# Patient Record
Sex: FEMALE | Race: WHITE | Employment: OTHER | URBAN - METROPOLITAN AREA
[De-identification: names, ages, dates, MRNs, and addresses within clinical notes are randomized per-mention and may not be internally consistent; named-entity substitution may affect disease eponyms.]

---

## 2017-01-09 DIAGNOSIS — F32.A DEPRESSION, UNSPECIFIED DEPRESSION TYPE: ICD-10-CM

## 2017-01-09 DIAGNOSIS — F41.9 ANXIETY: ICD-10-CM

## 2017-01-09 RX ORDER — CITALOPRAM 40 MG/1
TABLET ORAL
Qty: 90 TABLET | Refills: 3 | Status: SHIPPED | OUTPATIENT
Start: 2017-01-09 | End: 2018-04-03 | Stop reason: SDUPTHER

## 2017-02-21 ENCOUNTER — HOSPITAL ENCOUNTER (OUTPATIENT)
Age: 77
Setting detail: SPECIMEN
Discharge: HOME | End: 2017-02-21
Payer: MEDICARE

## 2017-02-21 LAB
INR BLD: 2.8
PROTHROMBIN TIME: 29.6 SEC (ref 9.4–12.6)

## 2017-03-07 ENCOUNTER — HOSPITAL ENCOUNTER (OUTPATIENT)
Age: 77
Setting detail: SPECIMEN
Discharge: HOME | End: 2017-03-07
Payer: MEDICARE

## 2017-03-07 LAB
INR BLD: 2.7
PROTHROMBIN TIME: 29.2 SEC (ref 9.4–12.6)

## 2017-04-07 ENCOUNTER — HOSPITAL ENCOUNTER (OUTPATIENT)
Age: 77
Setting detail: SPECIMEN
Discharge: HOME | End: 2017-04-07
Payer: MEDICARE

## 2017-04-07 LAB
INR BLD: 2.3
PROTHROMBIN TIME: 24.7 SEC (ref 9.4–12.6)

## 2017-05-09 ENCOUNTER — HOSPITAL ENCOUNTER (OUTPATIENT)
Age: 77
Setting detail: SPECIMEN
Discharge: HOME | End: 2017-05-09
Payer: MEDICARE

## 2017-05-09 LAB
INR BLD: 3.8
PROTHROMBIN TIME: 41 SEC (ref 9.4–12.6)

## 2017-05-18 ENCOUNTER — HOSPITAL ENCOUNTER (OUTPATIENT)
Age: 77
Setting detail: SPECIMEN
Discharge: HOME | End: 2017-05-18
Payer: MEDICARE

## 2017-05-18 LAB
INR BLD: 2.5
PROTHROMBIN TIME: 26.7 SEC (ref 9.4–12.6)

## 2017-05-23 ENCOUNTER — HOSPITAL ENCOUNTER (OUTPATIENT)
Age: 77
Discharge: HOME | End: 2017-05-23
Payer: MEDICARE

## 2017-05-23 ENCOUNTER — HOSPITAL ENCOUNTER (OUTPATIENT)
Dept: GENERAL RADIOLOGY | Age: 77
Discharge: HOME | End: 2017-05-23
Payer: MEDICARE

## 2017-05-23 DIAGNOSIS — I48.3 TYPICAL ATRIAL FLUTTER (HCC): ICD-10-CM

## 2017-05-23 LAB
ANION GAP SERPL CALCULATED.3IONS-SCNC: 17 MMOL/L (ref 9–17)
BNP INTERPRETATION: ABNORMAL
BUN BLDV-MCNC: 19 MG/DL (ref 8–23)
BUN/CREAT BLD: ABNORMAL (ref 9–20)
CALCIUM SERPL-MCNC: 9.3 MG/DL (ref 8.6–10.4)
CHLORIDE BLD-SCNC: 96 MMOL/L (ref 98–107)
CO2: 28 MMOL/L (ref 20–31)
CREAT SERPL-MCNC: 1.37 MG/DL (ref 0.5–0.9)
GFR AFRICAN AMERICAN: 45 ML/MIN
GFR NON-AFRICAN AMERICAN: 37 ML/MIN
GFR SERPL CREATININE-BSD FRML MDRD: ABNORMAL ML/MIN/{1.73_M2}
GFR SERPL CREATININE-BSD FRML MDRD: ABNORMAL ML/MIN/{1.73_M2}
GLUCOSE BLD-MCNC: 116 MG/DL (ref 70–99)
HCT VFR BLD CALC: 44.3 % (ref 36–46)
HEMOGLOBIN: 14.3 G/DL (ref 12–16)
MCH RBC QN AUTO: 31.2 PG (ref 26–34)
MCHC RBC AUTO-ENTMCNC: 32.3 G/DL (ref 31–37)
MCV RBC AUTO: 96.5 FL (ref 80–100)
PDW BLD-RTO: 15.5 % (ref 12.5–15.4)
PLATELET # BLD: 184 K/UL (ref 140–450)
PMV BLD AUTO: 9.8 FL (ref 6–12)
POTASSIUM SERPL-SCNC: 3.3 MMOL/L (ref 3.7–5.3)
PRO-BNP: 1033 PG/ML
RBC # BLD: 4.59 M/UL (ref 4–5.2)
SODIUM BLD-SCNC: 141 MMOL/L (ref 135–144)
WBC # BLD: 7.5 K/UL (ref 3.5–11)

## 2017-05-23 PROCEDURE — 71020 XR CHEST STANDARD TWO VW: CPT

## 2017-05-23 PROCEDURE — 83880 ASSAY OF NATRIURETIC PEPTIDE: CPT

## 2017-05-23 PROCEDURE — 85027 COMPLETE CBC AUTOMATED: CPT

## 2017-05-23 PROCEDURE — 36415 COLL VENOUS BLD VENIPUNCTURE: CPT

## 2017-05-23 PROCEDURE — 80048 BASIC METABOLIC PNL TOTAL CA: CPT

## 2017-06-20 ENCOUNTER — HOSPITAL ENCOUNTER (OUTPATIENT)
Dept: NON INVASIVE DIAGNOSTICS | Age: 77
Discharge: HOME | End: 2017-06-20
Payer: MEDICARE

## 2017-06-20 LAB
LV EF: 50 %
LVEF MODALITY: NORMAL

## 2017-06-20 PROCEDURE — 93306 TTE W/DOPPLER COMPLETE: CPT

## 2017-08-24 ENCOUNTER — HOSPITAL ENCOUNTER (OUTPATIENT)
Age: 77
Setting detail: SPECIMEN
Discharge: HOME | End: 2017-08-24
Payer: MEDICARE

## 2017-08-24 LAB
INR BLD: 2.9
PROTHROMBIN TIME: 30.8 SEC (ref 9.4–12.6)

## 2017-08-26 DIAGNOSIS — I89.0 LYMPHEDEMA OF LOWER EXTREMITY, UNSPECIFIED LATERALITY: ICD-10-CM

## 2017-08-28 RX ORDER — FUROSEMIDE 80 MG
TABLET ORAL
Qty: 90 TABLET | Refills: 3 | Status: SHIPPED | OUTPATIENT
Start: 2017-08-28 | End: 2018-08-28 | Stop reason: SDUPTHER

## 2017-09-07 ENCOUNTER — HOSPITAL ENCOUNTER (OUTPATIENT)
Age: 77
Setting detail: SPECIMEN
Discharge: HOME | End: 2017-09-07
Payer: MEDICARE

## 2017-09-07 LAB
INR BLD: 4
PROTHROMBIN TIME: 43 SEC (ref 9.4–12.6)

## 2017-09-14 ENCOUNTER — HOSPITAL ENCOUNTER (OUTPATIENT)
Age: 77
Setting detail: SPECIMEN
Discharge: HOME | End: 2017-09-14
Payer: MEDICARE

## 2017-09-14 LAB
INR BLD: 4.8
PROTHROMBIN TIME: 51.9 SEC (ref 9.4–12.6)

## 2017-09-19 ENCOUNTER — HOSPITAL ENCOUNTER (OUTPATIENT)
Age: 77
Setting detail: SPECIMEN
Discharge: HOME | End: 2017-09-19
Payer: MEDICARE

## 2017-09-19 LAB
INR BLD: 1.6
PROTHROMBIN TIME: 17 SEC (ref 9.4–12.6)

## 2017-09-27 ENCOUNTER — HOSPITAL ENCOUNTER (OUTPATIENT)
Age: 77
Setting detail: SPECIMEN
Discharge: HOME | End: 2017-09-27
Payer: MEDICARE

## 2017-09-27 LAB
INR BLD: 2.2
PROTHROMBIN TIME: 23.4 SEC (ref 9.4–12.6)

## 2017-10-04 ENCOUNTER — APPOINTMENT (OUTPATIENT)
Dept: GENERAL RADIOLOGY | Age: 77
End: 2017-10-04
Payer: MEDICARE

## 2017-10-04 ENCOUNTER — HOSPITAL ENCOUNTER (EMERGENCY)
Age: 77
Discharge: HOME | End: 2017-10-05
Attending: EMERGENCY MEDICINE
Payer: MEDICARE

## 2017-10-04 DIAGNOSIS — T14.8XXA BLOOD BLISTER: ICD-10-CM

## 2017-10-04 DIAGNOSIS — L03.116 CELLULITIS OF LEFT LOWER EXTREMITY: Primary | ICD-10-CM

## 2017-10-04 LAB
ABSOLUTE EOS #: 0.2 K/UL (ref 0–0.4)
ABSOLUTE LYMPH #: 1 K/UL (ref 1–4.8)
ABSOLUTE MONO #: 0.5 K/UL (ref 0.1–1.3)
ALBUMIN SERPL-MCNC: 3.5 G/DL (ref 3.5–5.2)
ALBUMIN/GLOBULIN RATIO: ABNORMAL (ref 1–2.5)
ALP BLD-CCNC: 93 U/L (ref 35–104)
ALT SERPL-CCNC: 15 U/L (ref 5–33)
ANION GAP SERPL CALCULATED.3IONS-SCNC: 12 MMOL/L (ref 9–17)
AST SERPL-CCNC: 17 U/L
BASOPHILS # BLD: 1 %
BASOPHILS ABSOLUTE: 0.1 K/UL (ref 0–0.2)
BILIRUB SERPL-MCNC: 0.45 MG/DL (ref 0.3–1.2)
BUN BLDV-MCNC: 21 MG/DL (ref 8–23)
BUN/CREAT BLD: ABNORMAL (ref 9–20)
CALCIUM SERPL-MCNC: 9.1 MG/DL (ref 8.6–10.4)
CHLORIDE BLD-SCNC: 99 MMOL/L (ref 98–107)
CO2: 31 MMOL/L (ref 20–31)
CREAT SERPL-MCNC: 1.32 MG/DL (ref 0.5–0.9)
DIFFERENTIAL TYPE: NORMAL
EOSINOPHILS RELATIVE PERCENT: 2 %
GFR AFRICAN AMERICAN: 47 ML/MIN
GFR NON-AFRICAN AMERICAN: 39 ML/MIN
GFR SERPL CREATININE-BSD FRML MDRD: ABNORMAL ML/MIN/{1.73_M2}
GFR SERPL CREATININE-BSD FRML MDRD: ABNORMAL ML/MIN/{1.73_M2}
GLUCOSE BLD-MCNC: 124 MG/DL (ref 70–99)
HCT VFR BLD CALC: 41.9 % (ref 36–46)
HEMOGLOBIN: 13.8 G/DL (ref 12–16)
LYMPHOCYTES # BLD: 14 %
MCH RBC QN AUTO: 32.8 PG (ref 26–34)
MCHC RBC AUTO-ENTMCNC: 32.9 G/DL (ref 31–37)
MCV RBC AUTO: 99.7 FL (ref 80–100)
MONOCYTES # BLD: 7 %
PDW BLD-RTO: 14.8 % (ref 11.5–14.9)
PLATELET # BLD: 180 K/UL (ref 150–450)
PLATELET ESTIMATE: NORMAL
PMV BLD AUTO: 10 FL (ref 6–12)
POTASSIUM SERPL-SCNC: 3.6 MMOL/L (ref 3.7–5.3)
RBC # BLD: 4.2 M/UL (ref 4–5.2)
RBC # BLD: NORMAL 10*6/UL
SEG NEUTROPHILS: 76 %
SEGMENTED NEUTROPHILS ABSOLUTE COUNT: 5.7 K/UL (ref 1.3–9.1)
SODIUM BLD-SCNC: 142 MMOL/L (ref 135–144)
TOTAL PROTEIN: 6.9 G/DL (ref 6.4–8.3)
WBC # BLD: 7.5 K/UL (ref 3.5–11)
WBC # BLD: NORMAL 10*3/UL

## 2017-10-04 PROCEDURE — 80053 COMPREHEN METABOLIC PANEL: CPT

## 2017-10-04 PROCEDURE — 36415 COLL VENOUS BLD VENIPUNCTURE: CPT

## 2017-10-04 PROCEDURE — 85025 COMPLETE CBC W/AUTO DIFF WBC: CPT

## 2017-10-04 PROCEDURE — 73590 X-RAY EXAM OF LOWER LEG: CPT

## 2017-10-04 PROCEDURE — 99284 EMERGENCY DEPT VISIT MOD MDM: CPT

## 2017-10-04 NOTE — ED AVS SNAPSHOT
You may receive a survey regarding the care you received during your stay. Your input is valuable to us. We encourage you to complete and return your survey in the envelope provided. We hope you will choose us in the future for your healthcare needs. Patient Information     Patient Name ROSENDO Sam 1940      Care Provided at:     Name Address Phone       Lissa Goldstein [address] [phone]            Your Visit    Here you will find information about your visit, including the reason for your visit. Please take this sheet with you when you visit your doctor or other health care provider in the future. It will help determine the best possible medical care for you at that time. If you have any questions once you leave the hospital, please call the department phone number listed below. Diagnoses this visit     Your diagnoses were CELLULITIS OF LEFT LOWER EXTREMITY and BLOOD BLISTER. Visit Information     Date of Visit Department Dept Phone    10/4/2017 1126 [facility] [phone]      You were seen by     You were seen by Melo Peters MD and Whit Carrillo MD.       Follow-up Appointments    Below is a list of your follow-up and future appointments. This may not be a complete list as you may have made appointments directly with providers that we are not aware of or your providers may have made some for you. Please call your providers to confirm appointments. It is important to keep your appointments. Please bring your current insurance card, photo ID, co-pay, and all medication bottles to your appointment. If self-pay, payment is expected at the time of service. Follow-up Information     Follow up with Dianne Zuniga MD. Schedule an appointment as soon as possible for a visit in 1 week.     Specialty:  Family Medicine    Contact information:    [facility]  [address] [facility],[address]  [address] [facility] [address] [phone] · If you get a scrape, cut, mild burn, or bite, wash the wound with clean water as soon as you can to help avoid infection. Don't use hydrogen peroxide or alcohol, which can slow healing. · If you have swelling in your legs (edema), support stockings and good skin care may help prevent leg sores and cellulitis. · Take care of your feet, especially if you have diabetes or other conditions that increase the risk of infection. Wear shoes and socks. Do not go barefoot. If you have athlete's foot or other skin problems on your feet, talk to your doctor about how to treat them. When should you call for help? Call your doctor now or seek immediate medical care if:  · You have signs that your infection is getting worse, such as:  ¨ Increased pain, swelling, warmth, or redness. ¨ Red streaks leading from the area. ¨ Pus draining from the area. ¨ A fever. · You get a rash. Watch closely for changes in your health, and be sure to contact your doctor if:  · You are not getting better after 1 day (24 hours). · You do not get better as expected. Where can you learn more? Go to [email or web address] org and sign in to your [employer] account. Enter H651 in the Countdown To Buy box to learn more about \"Cellulitis: Care Instructions. \"     If you do not have an account, please click on the \"Sign Up Now\" link. Current as of: October 13, 2016  Content Version: 11.3  © 2279-7631 [employer]. Care instructions adapted under license by [facility] ([facility]). If you have questions about a medical condition or this instruction, always ask your healthcare professional. [address] any warranty or liability for your use of this information. You were seen for a wound today. Please continue to take Keflex as prescribed. Home healthcare should contact you tomorrow for follow-up and wound checks. Please follow-up with Isabel Wadsworth MD within 1 week.  CKD (chronic kidney disease) stage 3, GFR 30-59 ml/min    Benign essential HTN    Hypokalemia    Status post laparoscopic cholecystectomy    Bile leak    Chronic cholecystitis with calculus    Stones common duct    Idiopathic gout    Lymphedema of both lower extremities    Venous stasis dermatitis of both lower extremities    Essential hypertension    Cardiac arrhythmia    Atrial flutter (HCC)       Isolation/Infection:   Isolation     No Isolation            Nurse Assessment:  Last Vital Signs: /63  Pulse 90  Temp 98 °F (36.7 °C) (Oral)   Resp 18  Ht 5' 4\" (1.626 m)  Wt (!) 303 lb (137.4 kg)  SpO2 93%  BMI 52.01 kg/m2    Last documented pain score (0-10 scale):    Last Weight:   Wt Readings from Last 1 Encounters:   10/04/17 (!) 303 lb (137.4 kg)     Mental Status:  oriented, alert, coherent, logical, thought processes intact and able to concentrate and follow conversation     IV Access:  - None    Nursing Mobility/ADLs:  Walking   Assisted uses a walker with one assist  Transfer  Assisted Needs one assist   Bathing  Assisted Needs assistance   Dressing  Assisted Needs assistance   Toileting  Assisted Needs assistance   Feeding  Independent   Meals on wheels for lunch   Med Admin  Independent  Med Delivery   none    Wound Care Documentation and Therapy:  Left lower anterior leg appearance of blood blister with cellulitis surrounding assessment         Elimination:  Continence:   · Bowel: Yes   Uses a bedpan  · Bladder: No  Uses depends   Urinary Catheter: None   Colostomy/Ileostomy/Ileal Conduit: No    Date of Last BM: 09/30/17         Safety Concerns:     None    Impairments/Disabilities:      Patient uses a walker at home needs assistance to get out of bed and out of chair to use walker     Nutrition Therapy:  Current Nutrition Therapy:   - Oral Diet:  Cardiac    Routes of Feeding: Oral  Liquids: no restricions  Daily Fluid Restriction: no Last Modified Barium Swallow with Video (Video Swallowing Test): not done    Treatments at the Time of Hospital Discharge:   Respiratory Treatments: none   Oxygen Therapy:  is not on home oxygen therapy. Ventilator:    - No ventilator support    Rehab Therapies: none  Weight Bearing Status/Restrictions: No weight bearing restirctions  Other Medical Equipment (for information only, NOT a DME order):  wheelchair  Other Treatments:     Patient's personal belongings (please select all that are sent with patient):  Glasses   Upper dentures  Lower partials    RN SIGNATURE: Flavia Camilo RN, Care Coordinator in ER     PHYSICIAN SECTION    Prognosis: Good    Condition at Discharge: Stable    Rehab Potential (if transferring to Rehab): Good    Recommended Labs or Other Treatments After Discharge: Wound care and evaluation    Physician Certification: I certify the above information and transfer of Joy Delong  is necessary for the continuing treatment of the diagnosis listed and that she requires Home Care for less 30 days.      Update Admission H&P: Changes in H&P as follows - Leg injury with hematoma    PHYSICIAN SIGNATURE:  Electronically signed by Zach Zamora MD on 10/4/17 at 11:33 PM    CASE MANAGEMENT/SOCIAL [address] Status Date:Emergency Room 10/04/17    [facility] Readmission Risk Assessment Score:  Risk Score: 24.75   (Score > 14= high risk for readmission)    Discharging to Facility/ Agency   · Name: [facility]  · [address] [place] [address]  · XYSXN:[phone]  · Fax:    Dialysis Facility (if applicable)   · Name:  · Address:  · Dialysis Schedule:  · Phone:  · Fax:    / signature: Flavia Camilo RN, Care Coordinator in ER

## 2017-10-05 VITALS
DIASTOLIC BLOOD PRESSURE: 50 MMHG | WEIGHT: 293 LBS | RESPIRATION RATE: 16 BRPM | SYSTOLIC BLOOD PRESSURE: 155 MMHG | OXYGEN SATURATION: 94 % | HEART RATE: 79 BPM | HEIGHT: 64 IN | BODY MASS INDEX: 50.02 KG/M2 | TEMPERATURE: 98 F

## 2017-10-05 NOTE — ED NOTES
Bed: 02  Expected date: 10/4/17  Expected time: 8:25 PM  Means of arrival: [facility]  Comments:     Randell Hargrove RN  10/04/17 2029
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
## 2017-10-06 ENCOUNTER — CARE COORDINATION (OUTPATIENT)
Dept: CARE COORDINATION | Age: 77
End: 2017-10-06

## 2017-10-06 NOTE — CARE COORDINATION
Ambulatory Care Coordination ED Follow up Call       Reason for ED Visit:  Left lower leg cellulitis  Care Management Risk Score: CMRS 1  How are you feeling? :     improved  Patient Reports the following:  none             Contact RNCC regarding any worsening symptoms from above. Did you call your PCP prior to going to the ED? No          Post Discharge Status:  What health concerns since you left the Emergency Room?  na    Do you have wounds that you are caring for at home? Yes    Do you have a follow up appt scheduled? yes    Review of Instructions:                                 Do you have any questions regarding your discharge instructions?:  No  Medications:    What questions do you have about your medications? No  Are you taking your medications as directed? If not - why? Yes   Can you afford your medications? yes  ADLS:  Do you need assistance of any kind at home? No   What assistance is needed?  na      FU appts/Provider:    Future Appointments  Date Time Provider Nirali Rodrigez   11/21/2017 3:30 PM Doris Crowe MD [address] Maintenance Due   Topic Date Due    Flu vaccine (1) 09/01/2017     Patient advised to contact PCP office to have HM items/records faxed to PCP Office directly?   N/A   Patient reports no changes, nurse from [facility] has just arrived to see patient they will make sure to update pcp with any concerns

## 2017-10-09 ENCOUNTER — HOSPITAL ENCOUNTER (OUTPATIENT)
Age: 77
Setting detail: SPECIMEN
Discharge: HOME | End: 2017-10-09
Payer: MEDICARE

## 2017-10-09 LAB
INR BLD: 3.1
PROTHROMBIN TIME: 33.4 SEC (ref 9.4–12.6)

## 2017-10-16 ENCOUNTER — HOSPITAL ENCOUNTER (OUTPATIENT)
Age: 77
Setting detail: SPECIMEN
Discharge: HOME | End: 2017-10-16
Payer: MEDICARE

## 2017-10-16 LAB
INR BLD: 2.4
PROTHROMBIN TIME: 25.8 SEC (ref 9.4–12.6)

## 2017-10-20 ENCOUNTER — CARE COORDINATION (OUTPATIENT)
Dept: CARE COORDINATION | Age: 77
End: 2017-10-20

## 2017-10-20 NOTE — CARE COORDINATION
Ambulatory Care Coordination Note  10/20/2017  CM Risk Score: 1  Husam Mortality Risk Score: 15.8    ACC: Pasha Me    Summary Note: Dr. Eze Cormier requested CC's assistance in getting the patient admitted to a skilled nursing facility. Gale Cano is currently active with [address]  left with Crehaylee requesting a return call to [phone]. Creed returns 's . He states a SNF has not been brought up to home care to his knowledge. He reports Gale Cano does require \"a lot of assistance\" adding \"her  is there caring for her\". He states he has met Gale Cano and her nurse was at the home for a visit yesterday. He states he will reach out to her assigned nurse and the patient. He will \"get back\" with CC yet today. Goals Addressed     None          Prior to Admission medications    Medication Sig Start Date End Date Taking? Authorizing Provider   furosemide (LASIX) 80 MG tablet TAKE 1 TABLET DAILY 8/28/17   Jannet Daley MD   vitamin B-12 (CYANOCOBALAMIN) 1000 MCG tablet Take 1,000 mcg by mouth daily    Historical Provider, MD   vitamin D (CHOLECALCIFEROL) 1000 UNIT TABS tablet Take 1,000 Units by mouth daily    Historical Provider, MD   busPIRone (BUSPAR) 15 MG tablet TAKE 1 TABLET TWICE A DAY 8/16/17   Jannet Daley MD   [facility] [place] 1 each by Does not apply route continuous Admit to skilled nursing facility for respite care. 6/1/17   Jannet Daley MD   ciprofloxacin (CIPRO) 500 MG tablet Take 1 tablet by mouth 2 times daily Dr Brian Garcia.  Dis. 5/2/17   Jannet Daley MD   Cephalexin 500 MG TABS Take 1 capsule by mouth 4 times daily FOR FLARE UP OF CELLULITIS 5/2/17   Jannet Daley MD   DULoxetine (CYMBALTA) 60 MG extended release capsule Take 1 capsule by mouth nightly 5/2/17   Jannet Daley MD   dicyclomine (BENTYL) 20 MG tablet Take 1 tablet by mouth every 6 hours 5/2/17   Jannet Daley MD   citalopram (CELEXA) 40 MG tablet TAKE 1 TABLET DAILY 1/9/17   Jannet Daley MD atenolol (TENORMIN) 25 MG tablet Take 1 tablet by mouth daily 10/6/16   Rose Mary Rodriguez MD   allopurinol (ZYLOPRIM) 300 MG tablet Take 1 tablet by mouth daily 5/16/16   Rose Mary Rodriguez MD   folic acid (FOLVITE) 1 MG tablet TAKE 1 TABLET BY MOUTH     DAILY 5/16/16   Rose Mary Rodriguez MD   amiodarone (CORDARONE) 200 MG tablet Take 2 tablets by mouth 2 times daily for 14 doses 12/2/15 3/30/16  Dalton Christopher MD   amiodarone (CORDARONE) 200 MG tablet Take 1 tablet by mouth daily 12/18/15 5/16/16  Dalton Christopher MD   warfarin (COUMADIN) 4 MG tablet TAKE 1 TABLET DAILY  Patient taking differently: takes 1/2 of a 4 mg tablet daily. 2/21/14   Oscar Vides NP   acetaminophen (TYLENOL) 650 MG CR tablet Take 650 mg by mouth every 8 hours as needed.  OTC     Rose Mary Rodriguez MD   Multiple Vitamins-Minerals (CENTRUM SILVER PO) Take by mouth See Admin Instructions OTC- 3 times weekly    Rose Mary Rodriguez MD       Future Appointments  Date Time Provider Nirali Rodrigez   11/21/2017 3:30 PM Rose Mary Rodriguez, [address] [place] [address]

## 2017-10-30 ENCOUNTER — HOSPITAL ENCOUNTER (OUTPATIENT)
Age: 77
Setting detail: SPECIMEN
Discharge: HOME | End: 2017-10-30
Payer: MEDICARE

## 2017-10-30 LAB
INR BLD: 3.1
PROTHROMBIN TIME: 33.8 SEC (ref 9.4–12.6)

## 2017-11-13 ENCOUNTER — HOSPITAL ENCOUNTER (OUTPATIENT)
Age: 77
Setting detail: SPECIMEN
Discharge: HOME | End: 2017-11-13
Payer: MEDICARE

## 2017-11-13 LAB
INR BLD: 3.4
PROTHROMBIN TIME: 36.8 SEC (ref 9.4–12.6)

## 2017-11-29 ENCOUNTER — HOSPITAL ENCOUNTER (OUTPATIENT)
Age: 77
Setting detail: SPECIMEN
Discharge: HOME | End: 2017-11-29
Payer: MEDICARE

## 2017-11-29 LAB
INR BLD: 4.9
PROTHROMBIN TIME: 52.5 SEC (ref 9.4–12.6)

## 2017-12-01 ENCOUNTER — HOSPITAL ENCOUNTER (OUTPATIENT)
Age: 77
Setting detail: SPECIMEN
Discharge: HOME | End: 2017-12-01
Payer: MEDICARE

## 2017-12-01 LAB
INR BLD: 4.9
PROTHROMBIN TIME: 53.2 SEC (ref 9.4–12.6)

## 2017-12-04 ENCOUNTER — HOSPITAL ENCOUNTER (OUTPATIENT)
Age: 77
Setting detail: SPECIMEN
Discharge: HOME | End: 2017-12-04
Payer: MEDICARE

## 2017-12-04 LAB
INR BLD: 2.5
PROTHROMBIN TIME: 27.2 SEC (ref 9.4–12.6)

## 2017-12-06 ENCOUNTER — HOSPITAL ENCOUNTER (OUTPATIENT)
Age: 77
Setting detail: SPECIMEN
Discharge: HOME | End: 2017-12-06
Payer: MEDICARE

## 2017-12-06 LAB
INR BLD: 1.8
PROTHROMBIN TIME: 19 SEC (ref 9.4–12.6)

## 2017-12-13 ENCOUNTER — HOSPITAL ENCOUNTER (OUTPATIENT)
Age: 77
Setting detail: SPECIMEN
Discharge: HOME | End: 2017-12-13
Payer: MEDICARE

## 2017-12-13 LAB
INR BLD: 2
PROTHROMBIN TIME: 22 SEC (ref 9.4–12.6)

## 2018-01-08 ENCOUNTER — HOSPITAL ENCOUNTER (OUTPATIENT)
Dept: WOMENS IMAGING | Age: 78
Discharge: HOME | End: 2018-01-08
Payer: MEDICARE

## 2018-01-08 DIAGNOSIS — N64.4 BREAST PAIN: ICD-10-CM

## 2018-01-08 PROCEDURE — G0279 TOMOSYNTHESIS, MAMMO: HCPCS

## 2018-01-08 PROCEDURE — 76642 ULTRASOUND BREAST LIMITED: CPT

## 2018-01-10 ENCOUNTER — HOSPITAL ENCOUNTER (OUTPATIENT)
Age: 78
Setting detail: SPECIMEN
Discharge: HOME | End: 2018-01-10
Payer: MEDICARE

## 2018-01-10 LAB
INR BLD: 2.5
PROTHROMBIN TIME: 27 SEC (ref 9.4–12.6)

## 2018-01-31 ENCOUNTER — HOSPITAL ENCOUNTER (OUTPATIENT)
Age: 78
Setting detail: SPECIMEN
Discharge: HOME | End: 2018-01-31
Payer: MEDICARE

## 2018-01-31 LAB
INR BLD: 2.5
PROTHROMBIN TIME: 27.3 SEC (ref 9.4–12.6)

## 2018-02-14 ENCOUNTER — HOSPITAL ENCOUNTER (OUTPATIENT)
Age: 78
Setting detail: SPECIMEN
Discharge: HOME | End: 2018-02-14
Payer: MEDICARE

## 2018-02-14 LAB
INR BLD: 3.1
PROTHROMBIN TIME: 31.1 SEC (ref 9–12)

## 2018-02-28 ENCOUNTER — HOSPITAL ENCOUNTER (OUTPATIENT)
Age: 78
Setting detail: SPECIMEN
Discharge: HOME | End: 2018-02-28
Payer: MEDICARE

## 2018-02-28 LAB
INR BLD: 1.4
PROTHROMBIN TIME: 15.1 SEC (ref 9–12)

## 2018-03-06 ENCOUNTER — HOSPITAL ENCOUNTER (OUTPATIENT)
Dept: GENERAL RADIOLOGY | Facility: CLINIC | Age: 78
Discharge: HOME | End: 2018-03-08
Payer: MEDICARE

## 2018-03-06 ENCOUNTER — HOSPITAL ENCOUNTER (OUTPATIENT)
Facility: CLINIC | Age: 78
Discharge: HOME | End: 2018-03-08
Payer: MEDICARE

## 2018-03-06 DIAGNOSIS — N63.0 BREAST MASS IN FEMALE: ICD-10-CM

## 2018-03-06 DIAGNOSIS — R22.2 CHEST WALL MASS: ICD-10-CM

## 2018-03-06 PROCEDURE — 71046 X-RAY EXAM CHEST 2 VIEWS: CPT

## 2018-03-16 ENCOUNTER — HOSPITAL ENCOUNTER (OUTPATIENT)
Age: 78
Setting detail: SPECIMEN
Discharge: HOME | End: 2018-03-16
Payer: MEDICARE

## 2018-03-16 LAB
INR BLD: 3
PROTHROMBIN TIME: 29.7 SEC (ref 9–12)

## 2018-03-26 ENCOUNTER — HOSPITAL ENCOUNTER (OUTPATIENT)
Age: 78
Setting detail: SPECIMEN
Discharge: HOME | End: 2018-03-26
Payer: MEDICARE

## 2018-03-26 LAB
INR BLD: 2.2
PROTHROMBIN TIME: 21.9 SEC (ref 9–12)

## 2018-04-03 DIAGNOSIS — F32.A DEPRESSION, UNSPECIFIED DEPRESSION TYPE: ICD-10-CM

## 2018-04-03 DIAGNOSIS — F41.9 ANXIETY: ICD-10-CM

## 2018-04-03 RX ORDER — CITALOPRAM 40 MG/1
TABLET ORAL
Qty: 90 TABLET | Refills: 3 | Status: SHIPPED | OUTPATIENT
Start: 2018-04-03 | End: 2019-04-26 | Stop reason: SDUPTHER

## 2018-04-09 ENCOUNTER — HOSPITAL ENCOUNTER (OUTPATIENT)
Dept: CT IMAGING | Age: 78
Discharge: HOME | End: 2018-04-11
Payer: MEDICARE

## 2018-04-09 DIAGNOSIS — N63.20 LEFT BREAST MASS: ICD-10-CM

## 2018-04-09 LAB
BUN BLDV-MCNC: 18 MG/DL (ref 8–23)
CREAT SERPL-MCNC: 1.08 MG/DL (ref 0.5–0.9)
GFR AFRICAN AMERICAN: 60 ML/MIN
GFR NON-AFRICAN AMERICAN: 49 ML/MIN
GFR SERPL CREATININE-BSD FRML MDRD: ABNORMAL ML/MIN/{1.73_M2}
GFR SERPL CREATININE-BSD FRML MDRD: ABNORMAL ML/MIN/{1.73_M2}

## 2018-04-09 PROCEDURE — 6360000004 HC RX CONTRAST MEDICATION: Performed by: FAMILY MEDICINE

## 2018-04-09 PROCEDURE — 82565 ASSAY OF CREATININE: CPT

## 2018-04-09 PROCEDURE — 84520 ASSAY OF UREA NITROGEN: CPT

## 2018-04-09 PROCEDURE — 2580000003 HC RX 258: Performed by: FAMILY MEDICINE

## 2018-04-09 PROCEDURE — 71260 CT THORAX DX C+: CPT

## 2018-04-09 PROCEDURE — 36415 COLL VENOUS BLD VENIPUNCTURE: CPT

## 2018-04-09 RX ORDER — SODIUM CHLORIDE 0.9 % (FLUSH) 0.9 %
10 SYRINGE (ML) INJECTION PRN
Status: DISCONTINUED | OUTPATIENT
Start: 2018-04-09 | End: 2018-04-12 | Stop reason: HOSPADM

## 2018-04-09 RX ORDER — 0.9 % SODIUM CHLORIDE 0.9 %
100 INTRAVENOUS SOLUTION INTRAVENOUS ONCE
Status: COMPLETED | OUTPATIENT
Start: 2018-04-09 | End: 2018-04-09

## 2018-04-09 RX ADMIN — Medication 10 ML: at 14:33

## 2018-04-09 RX ADMIN — IOPAMIDOL 75 ML: 755 INJECTION, SOLUTION INTRAVENOUS at 14:32

## 2018-04-09 RX ADMIN — SODIUM CHLORIDE 100 ML: 9 INJECTION, SOLUTION INTRAVENOUS at 14:33

## 2018-04-30 ENCOUNTER — HOSPITAL ENCOUNTER (OUTPATIENT)
Age: 78
Setting detail: SPECIMEN
Discharge: HOME | End: 2018-04-30
Payer: MEDICARE

## 2018-04-30 LAB
INR BLD: 3
PROTHROMBIN TIME: 30.1 SEC (ref 9–12)

## 2018-05-29 ENCOUNTER — HOSPITAL ENCOUNTER (OUTPATIENT)
Age: 78
Setting detail: SPECIMEN
Discharge: HOME | End: 2018-05-29
Payer: MEDICARE

## 2018-05-29 LAB
INR BLD: 2.2
PROTHROMBIN TIME: 21.9 SEC (ref 9–12)

## 2018-06-05 ENCOUNTER — HOSPITAL ENCOUNTER (OUTPATIENT)
Age: 78
Setting detail: SPECIMEN
Discharge: HOME | End: 2018-06-05
Payer: MEDICARE

## 2018-06-05 DIAGNOSIS — E55.9 VITAMIN D DEFICIENCY: ICD-10-CM

## 2018-06-05 DIAGNOSIS — I10 ESSENTIAL HYPERTENSION: ICD-10-CM

## 2018-06-05 DIAGNOSIS — E78.5 DYSLIPIDEMIA: ICD-10-CM

## 2018-06-05 PROBLEM — F33.41 RECURRENT MAJOR DEPRESSIVE DISORDER, IN PARTIAL REMISSION (HCC): Status: ACTIVE | Noted: 2018-06-05

## 2018-06-05 LAB
ALBUMIN SERPL-MCNC: 3.6 G/DL (ref 3.5–5.2)
ALBUMIN/GLOBULIN RATIO: 1 (ref 1–2.5)
ALP BLD-CCNC: 96 U/L (ref 35–104)
ALT SERPL-CCNC: 12 U/L (ref 5–33)
ANION GAP SERPL CALCULATED.3IONS-SCNC: 17 MMOL/L (ref 9–17)
AST SERPL-CCNC: 15 U/L
BILIRUB SERPL-MCNC: 0.45 MG/DL (ref 0.3–1.2)
BUN BLDV-MCNC: 19 MG/DL (ref 8–23)
BUN/CREAT BLD: ABNORMAL (ref 9–20)
CALCIUM SERPL-MCNC: 9.1 MG/DL (ref 8.6–10.4)
CHLORIDE BLD-SCNC: 103 MMOL/L (ref 98–107)
CHOLESTEROL/HDL RATIO: 3.9
CHOLESTEROL: 183 MG/DL
CO2: 26 MMOL/L (ref 20–31)
CREAT SERPL-MCNC: 1.33 MG/DL (ref 0.5–0.9)
GFR AFRICAN AMERICAN: 47 ML/MIN
GFR NON-AFRICAN AMERICAN: 39 ML/MIN
GFR SERPL CREATININE-BSD FRML MDRD: ABNORMAL ML/MIN/{1.73_M2}
GFR SERPL CREATININE-BSD FRML MDRD: ABNORMAL ML/MIN/{1.73_M2}
GLUCOSE BLD-MCNC: 105 MG/DL (ref 70–99)
HCT VFR BLD CALC: 42.2 % (ref 36.3–47.1)
HDLC SERPL-MCNC: 47 MG/DL
HEMOGLOBIN: 13.1 G/DL (ref 11.9–15.1)
LDL CHOLESTEROL: 110 MG/DL (ref 0–130)
MCH RBC QN AUTO: 31.6 PG (ref 25.2–33.5)
MCHC RBC AUTO-ENTMCNC: 31 G/DL (ref 28.4–34.8)
MCV RBC AUTO: 101.9 FL (ref 82.6–102.9)
NRBC AUTOMATED: 0 PER 100 WBC
PDW BLD-RTO: 14.3 % (ref 11.8–14.4)
PLATELET # BLD: 209 K/UL (ref 138–453)
PMV BLD AUTO: 11.8 FL (ref 8.1–13.5)
POTASSIUM SERPL-SCNC: 4 MMOL/L (ref 3.7–5.3)
RBC # BLD: 4.14 M/UL (ref 3.95–5.11)
SODIUM BLD-SCNC: 146 MMOL/L (ref 135–144)
TOTAL PROTEIN: 7.2 G/DL (ref 6.4–8.3)
TRIGL SERPL-MCNC: 132 MG/DL
VLDLC SERPL CALC-MCNC: NORMAL MG/DL (ref 1–30)
WBC # BLD: 8 K/UL (ref 3.5–11.3)

## 2018-06-06 LAB — VITAMIN D 25-HYDROXY: 20.8 NG/ML (ref 30–100)

## 2018-06-26 ENCOUNTER — HOSPITAL ENCOUNTER (OUTPATIENT)
Age: 78
Setting detail: SPECIMEN
Discharge: HOME | End: 2018-06-26
Payer: MEDICARE

## 2018-06-26 LAB
INR BLD: 2.7
PROTHROMBIN TIME: 27.2 SEC (ref 9–12)

## 2018-07-17 ENCOUNTER — HOSPITAL ENCOUNTER (OUTPATIENT)
Age: 78
Setting detail: SPECIMEN
Discharge: HOME | End: 2018-07-17
Payer: MEDICARE

## 2018-07-17 LAB
INR BLD: 2.5
PROTHROMBIN TIME: 25.3 SEC (ref 9–12)

## 2018-08-14 ENCOUNTER — HOSPITAL ENCOUNTER (OUTPATIENT)
Age: 78
Setting detail: SPECIMEN
Discharge: HOME | End: 2018-08-14
Payer: MEDICARE

## 2018-08-14 LAB
INR BLD: 2.7
PROTHROMBIN TIME: 27.4 SEC (ref 9–12)

## 2018-09-11 ENCOUNTER — HOSPITAL ENCOUNTER (OUTPATIENT)
Age: 78
Setting detail: SPECIMEN
Discharge: HOME | End: 2018-09-11
Payer: MEDICARE

## 2018-09-11 LAB
INR BLD: 5.5
PROTHROMBIN TIME: 52.9 SEC (ref 9–12)

## 2018-09-13 ENCOUNTER — HOSPITAL ENCOUNTER (OUTPATIENT)
Age: 78
Setting detail: SPECIMEN
Discharge: HOME | End: 2018-09-13
Payer: MEDICARE

## 2018-09-13 LAB
INR BLD: 3.7
PROTHROMBIN TIME: 35.9 SEC (ref 9–12)

## 2018-09-20 ENCOUNTER — HOSPITAL ENCOUNTER (OUTPATIENT)
Age: 78
Setting detail: SPECIMEN
Discharge: HOME | End: 2018-09-20
Payer: MEDICARE

## 2018-09-20 LAB
INR BLD: 3.8
PROTHROMBIN TIME: 37.2 SEC (ref 9–12)

## 2018-09-27 ENCOUNTER — HOSPITAL ENCOUNTER (OUTPATIENT)
Age: 78
Setting detail: SPECIMEN
Discharge: HOME | End: 2018-09-27
Payer: MEDICARE

## 2018-09-27 LAB
INR BLD: 2.6
PROTHROMBIN TIME: 26.2 SEC (ref 9–12)

## 2018-10-11 ENCOUNTER — HOSPITAL ENCOUNTER (OUTPATIENT)
Age: 78
Setting detail: SPECIMEN
Discharge: HOME | End: 2018-10-11
Payer: MEDICARE

## 2018-10-11 LAB
INR BLD: 4.1
PROTHROMBIN TIME: 39.6 SEC (ref 9–12)

## 2018-10-18 ENCOUNTER — HOSPITAL ENCOUNTER (OUTPATIENT)
Age: 78
Setting detail: SPECIMEN
Discharge: HOME | End: 2018-10-18
Payer: MEDICARE

## 2018-10-18 LAB
INR BLD: 3.3
PROTHROMBIN TIME: 32.6 SEC (ref 9–12)

## 2018-10-30 ENCOUNTER — HOSPITAL ENCOUNTER (OUTPATIENT)
Age: 78
Setting detail: SPECIMEN
Discharge: HOME | End: 2018-10-30
Payer: MEDICARE

## 2018-10-30 LAB
INR BLD: 5.2
PROTHROMBIN TIME: 50.2 SEC (ref 9–12)

## 2018-11-02 ENCOUNTER — HOSPITAL ENCOUNTER (OUTPATIENT)
Age: 78
Setting detail: SPECIMEN
Discharge: HOME | End: 2018-11-02
Payer: MEDICARE

## 2018-11-02 LAB
INR BLD: 2.5
PROTHROMBIN TIME: 24.8 SEC (ref 9–12)

## 2018-11-19 ENCOUNTER — HOSPITAL ENCOUNTER (OUTPATIENT)
Age: 78
Setting detail: SPECIMEN
Discharge: HOME | End: 2018-11-19
Payer: MEDICARE

## 2018-11-19 LAB
INR BLD: 2.6
PROTHROMBIN TIME: 26.5 SEC (ref 9–12)

## 2018-12-03 ENCOUNTER — HOSPITAL ENCOUNTER (OUTPATIENT)
Age: 78
Setting detail: SPECIMEN
Discharge: HOME | End: 2018-12-03
Payer: MEDICARE

## 2018-12-03 LAB
INR BLD: 3
PROTHROMBIN TIME: 29.6 SEC (ref 9–12)

## 2018-12-16 ENCOUNTER — HOSPITAL ENCOUNTER (INPATIENT)
Age: 78
LOS: 4 days | Discharge: HOME | DRG: 155 | End: 2018-12-20
Attending: EMERGENCY MEDICINE | Admitting: FAMILY MEDICINE
Payer: MEDICARE

## 2018-12-16 ENCOUNTER — APPOINTMENT (OUTPATIENT)
Dept: CT IMAGING | Age: 78
DRG: 155 | End: 2018-12-16
Payer: MEDICARE

## 2018-12-16 DIAGNOSIS — L03.211 CELLULITIS OF FACE: ICD-10-CM

## 2018-12-16 DIAGNOSIS — H70.91 MASTOIDITIS OF RIGHT SIDE: Primary | ICD-10-CM

## 2018-12-16 LAB
ABSOLUTE EOS #: 0 K/UL (ref 0–0.4)
ABSOLUTE IMMATURE GRANULOCYTE: ABNORMAL K/UL (ref 0–0.3)
ABSOLUTE LYMPH #: 1.15 K/UL (ref 1–4.8)
ABSOLUTE MONO #: 0.72 K/UL (ref 0.1–1.3)
ALBUMIN SERPL-MCNC: 3.3 G/DL (ref 3.5–5.2)
ALBUMIN/GLOBULIN RATIO: ABNORMAL (ref 1–2.5)
ALP BLD-CCNC: 82 U/L (ref 35–104)
ALT SERPL-CCNC: 14 U/L (ref 5–33)
ANION GAP SERPL CALCULATED.3IONS-SCNC: 11 MMOL/L (ref 9–17)
AST SERPL-CCNC: 18 U/L
BASOPHILS # BLD: 0 % (ref 0–2)
BASOPHILS ABSOLUTE: 0 K/UL (ref 0–0.2)
BILIRUB SERPL-MCNC: 1.47 MG/DL (ref 0.3–1.2)
BUN BLDV-MCNC: 16 MG/DL (ref 8–23)
BUN/CREAT BLD: ABNORMAL (ref 9–20)
CALCIUM SERPL-MCNC: 8.9 MG/DL (ref 8.6–10.4)
CHLORIDE BLD-SCNC: 98 MMOL/L (ref 98–107)
CO2: 31 MMOL/L (ref 20–31)
CREAT SERPL-MCNC: 0.98 MG/DL (ref 0.5–0.9)
DIFFERENTIAL TYPE: ABNORMAL
EOSINOPHILS RELATIVE PERCENT: 0 % (ref 0–4)
GFR AFRICAN AMERICAN: >60 ML/MIN
GFR NON-AFRICAN AMERICAN: 55 ML/MIN
GFR SERPL CREATININE-BSD FRML MDRD: ABNORMAL ML/MIN/{1.73_M2}
GFR SERPL CREATININE-BSD FRML MDRD: ABNORMAL ML/MIN/{1.73_M2}
GLUCOSE BLD-MCNC: 116 MG/DL (ref 70–99)
HCT VFR BLD CALC: 36.9 % (ref 36–46)
HEMOGLOBIN: 12.1 G/DL (ref 12–16)
IMMATURE GRANULOCYTES: ABNORMAL %
INR BLD: 2.6
LACTIC ACID: 1.6 MMOL/L (ref 0.5–2.2)
LYMPHOCYTES # BLD: 8 % (ref 24–44)
MCH RBC QN AUTO: 31.6 PG (ref 26–34)
MCHC RBC AUTO-ENTMCNC: 32.8 G/DL (ref 31–37)
MCV RBC AUTO: 96.5 FL (ref 80–100)
MONOCYTES # BLD: 5 % (ref 1–7)
MORPHOLOGY: NORMAL
NRBC AUTOMATED: ABNORMAL PER 100 WBC
PARTIAL THROMBOPLASTIN TIME: 50.6 SEC (ref 23–31)
PDW BLD-RTO: 14.8 % (ref 11.5–14.9)
PLATELET # BLD: 168 K/UL (ref 150–450)
PLATELET ESTIMATE: ABNORMAL
PMV BLD AUTO: 9.2 FL (ref 6–12)
POTASSIUM SERPL-SCNC: 3.1 MMOL/L (ref 3.7–5.3)
PROTHROMBIN TIME: 26.1 SEC (ref 9.7–12)
RBC # BLD: 3.82 M/UL (ref 4–5.2)
RBC # BLD: ABNORMAL 10*6/UL
SEG NEUTROPHILS: 87 % (ref 36–66)
SEGMENTED NEUTROPHILS ABSOLUTE COUNT: 12.53 K/UL (ref 1.3–9.1)
SODIUM BLD-SCNC: 140 MMOL/L (ref 135–144)
TOTAL PROTEIN: 6.6 G/DL (ref 6.4–8.3)
WBC # BLD: 14.4 K/UL (ref 3.5–11)
WBC # BLD: ABNORMAL 10*3/UL

## 2018-12-16 PROCEDURE — 6360000002 HC RX W HCPCS: Performed by: EMERGENCY MEDICINE

## 2018-12-16 PROCEDURE — 85610 PROTHROMBIN TIME: CPT

## 2018-12-16 PROCEDURE — 6360000004 HC RX CONTRAST MEDICATION: Performed by: EMERGENCY MEDICINE

## 2018-12-16 PROCEDURE — 36415 COLL VENOUS BLD VENIPUNCTURE: CPT

## 2018-12-16 PROCEDURE — 2580000003 HC RX 258: Performed by: FAMILY MEDICINE

## 2018-12-16 PROCEDURE — 85025 COMPLETE CBC W/AUTO DIFF WBC: CPT

## 2018-12-16 PROCEDURE — 6370000000 HC RX 637 (ALT 250 FOR IP): Performed by: FAMILY MEDICINE

## 2018-12-16 PROCEDURE — 85730 THROMBOPLASTIN TIME PARTIAL: CPT

## 2018-12-16 PROCEDURE — 83605 ASSAY OF LACTIC ACID: CPT

## 2018-12-16 PROCEDURE — 96375 TX/PRO/DX INJ NEW DRUG ADDON: CPT

## 2018-12-16 PROCEDURE — 70487 CT MAXILLOFACIAL W/DYE: CPT

## 2018-12-16 PROCEDURE — 80053 COMPREHEN METABOLIC PANEL: CPT

## 2018-12-16 PROCEDURE — 99285 EMERGENCY DEPT VISIT HI MDM: CPT

## 2018-12-16 PROCEDURE — 2500000003 HC RX 250 WO HCPCS: Performed by: EMERGENCY MEDICINE

## 2018-12-16 PROCEDURE — 1200000000 HC SEMI PRIVATE

## 2018-12-16 PROCEDURE — 96374 THER/PROPH/DIAG INJ IV PUSH: CPT

## 2018-12-16 PROCEDURE — 2500000003 HC RX 250 WO HCPCS: Performed by: FAMILY MEDICINE

## 2018-12-16 PROCEDURE — 2580000003 HC RX 258: Performed by: EMERGENCY MEDICINE

## 2018-12-16 RX ORDER — POTASSIUM CHLORIDE 20MEQ/15ML
40 LIQUID (ML) ORAL PRN
Status: DISCONTINUED | OUTPATIENT
Start: 2018-12-16 | End: 2018-12-20 | Stop reason: HOSPADM

## 2018-12-16 RX ORDER — SENNOSIDES 8.6 MG
650 CAPSULE ORAL EVERY 8 HOURS PRN
Status: DISCONTINUED | OUTPATIENT
Start: 2018-12-16 | End: 2018-12-16

## 2018-12-16 RX ORDER — 0.9 % SODIUM CHLORIDE 0.9 %
500 INTRAVENOUS SOLUTION INTRAVENOUS ONCE
Status: COMPLETED | OUTPATIENT
Start: 2018-12-16 | End: 2018-12-16

## 2018-12-16 RX ORDER — ALLOPURINOL 300 MG/1
300 TABLET ORAL DAILY
Status: DISCONTINUED | OUTPATIENT
Start: 2018-12-16 | End: 2018-12-20 | Stop reason: HOSPADM

## 2018-12-16 RX ORDER — LANOLIN ALCOHOL/MO/W.PET/CERES
1000 CREAM (GRAM) TOPICAL DAILY
Status: DISCONTINUED | OUTPATIENT
Start: 2018-12-16 | End: 2018-12-20 | Stop reason: HOSPADM

## 2018-12-16 RX ORDER — SODIUM CHLORIDE 0.9 % (FLUSH) 0.9 %
10 SYRINGE (ML) INJECTION PRN
Status: DISCONTINUED | OUTPATIENT
Start: 2018-12-16 | End: 2018-12-20 | Stop reason: HOSPADM

## 2018-12-16 RX ORDER — WARFARIN SODIUM 2 MG/1
2 TABLET ORAL DAILY
Status: DISCONTINUED | OUTPATIENT
Start: 2018-12-16 | End: 2018-12-20 | Stop reason: HOSPADM

## 2018-12-16 RX ORDER — ATENOLOL 25 MG/1
25 TABLET ORAL DAILY
Status: DISCONTINUED | OUTPATIENT
Start: 2018-12-16 | End: 2018-12-20 | Stop reason: HOSPADM

## 2018-12-16 RX ORDER — SODIUM CHLORIDE 0.9 % (FLUSH) 0.9 %
10 SYRINGE (ML) INJECTION EVERY 12 HOURS SCHEDULED
Status: DISCONTINUED | OUTPATIENT
Start: 2018-12-16 | End: 2018-12-20 | Stop reason: HOSPADM

## 2018-12-16 RX ORDER — DULOXETIN HYDROCHLORIDE 60 MG/1
60 CAPSULE, DELAYED RELEASE ORAL DAILY
Status: DISCONTINUED | OUTPATIENT
Start: 2018-12-16 | End: 2018-12-20 | Stop reason: HOSPADM

## 2018-12-16 RX ORDER — FUROSEMIDE 40 MG/1
40 TABLET ORAL DAILY
Status: DISCONTINUED | OUTPATIENT
Start: 2018-12-16 | End: 2018-12-20 | Stop reason: HOSPADM

## 2018-12-16 RX ORDER — ACETAMINOPHEN 325 MG/1
650 TABLET ORAL EVERY 4 HOURS PRN
Status: DISCONTINUED | OUTPATIENT
Start: 2018-12-16 | End: 2018-12-20 | Stop reason: HOSPADM

## 2018-12-16 RX ORDER — POTASSIUM CHLORIDE 7.45 MG/ML
10 INJECTION INTRAVENOUS PRN
Status: DISCONTINUED | OUTPATIENT
Start: 2018-12-16 | End: 2018-12-20 | Stop reason: HOSPADM

## 2018-12-16 RX ORDER — ONDANSETRON 2 MG/ML
4 INJECTION INTRAMUSCULAR; INTRAVENOUS ONCE
Status: COMPLETED | OUTPATIENT
Start: 2018-12-16 | End: 2018-12-16

## 2018-12-16 RX ORDER — BUSPIRONE HYDROCHLORIDE 10 MG/1
15 TABLET ORAL 2 TIMES DAILY
Status: DISCONTINUED | OUTPATIENT
Start: 2018-12-16 | End: 2018-12-20 | Stop reason: HOSPADM

## 2018-12-16 RX ORDER — DICYCLOMINE HCL 20 MG
20 TABLET ORAL
Status: DISCONTINUED | OUTPATIENT
Start: 2018-12-16 | End: 2018-12-20 | Stop reason: HOSPADM

## 2018-12-16 RX ORDER — FENTANYL CITRATE 50 UG/ML
50 INJECTION, SOLUTION INTRAMUSCULAR; INTRAVENOUS ONCE
Status: COMPLETED | OUTPATIENT
Start: 2018-12-16 | End: 2018-12-16

## 2018-12-16 RX ORDER — CITALOPRAM 40 MG/1
40 TABLET ORAL DAILY
Status: DISCONTINUED | OUTPATIENT
Start: 2018-12-16 | End: 2018-12-20 | Stop reason: HOSPADM

## 2018-12-16 RX ORDER — POTASSIUM CHLORIDE 20 MEQ/1
40 TABLET, EXTENDED RELEASE ORAL PRN
Status: DISCONTINUED | OUTPATIENT
Start: 2018-12-16 | End: 2018-12-20 | Stop reason: HOSPADM

## 2018-12-16 RX ORDER — ONDANSETRON 2 MG/ML
4 INJECTION INTRAMUSCULAR; INTRAVENOUS EVERY 6 HOURS PRN
Status: DISCONTINUED | OUTPATIENT
Start: 2018-12-16 | End: 2018-12-20 | Stop reason: HOSPADM

## 2018-12-16 RX ORDER — 0.9 % SODIUM CHLORIDE 0.9 %
40 INTRAVENOUS SOLUTION INTRAVENOUS ONCE
Status: COMPLETED | OUTPATIENT
Start: 2018-12-16 | End: 2018-12-16

## 2018-12-16 RX ADMIN — IOPAMIDOL 75 ML: 755 INJECTION, SOLUTION INTRAVENOUS at 11:02

## 2018-12-16 RX ADMIN — POTASSIUM CHLORIDE 40 MEQ: 20 TABLET, EXTENDED RELEASE ORAL at 16:31

## 2018-12-16 RX ADMIN — BUSPIRONE HYDROCHLORIDE 15 MG: 10 TABLET ORAL at 20:35

## 2018-12-16 RX ADMIN — ATENOLOL 25 MG: 25 TABLET ORAL at 14:59

## 2018-12-16 RX ADMIN — ACETAMINOPHEN 650 MG: 325 TABLET, FILM COATED ORAL at 14:59

## 2018-12-16 RX ADMIN — VANCOMYCIN HYDROCHLORIDE 1250 MG: 10 INJECTION, POWDER, LYOPHILIZED, FOR SOLUTION INTRAVENOUS at 13:23

## 2018-12-16 RX ADMIN — SODIUM CHLORIDE 40 ML: 9 INJECTION, SOLUTION INTRAVENOUS at 11:02

## 2018-12-16 RX ADMIN — AZTREONAM 1 G: 1 INJECTION, POWDER, LYOPHILIZED, FOR SOLUTION INTRAMUSCULAR; INTRAVENOUS at 13:00

## 2018-12-16 RX ADMIN — AZTREONAM 1 G: 1 INJECTION, POWDER, LYOPHILIZED, FOR SOLUTION INTRAMUSCULAR; INTRAVENOUS at 20:29

## 2018-12-16 RX ADMIN — ONDANSETRON 4 MG: 2 INJECTION INTRAMUSCULAR; INTRAVENOUS at 10:09

## 2018-12-16 RX ADMIN — DICYCLOMINE HYDROCHLORIDE 20 MG: 20 TABLET ORAL at 17:31

## 2018-12-16 RX ADMIN — DULOXETINE 60 MG: 60 CAPSULE, DELAYED RELEASE ORAL at 20:34

## 2018-12-16 RX ADMIN — SODIUM CHLORIDE 500 ML: 9 INJECTION, SOLUTION INTRAVENOUS at 10:09

## 2018-12-16 RX ADMIN — DICYCLOMINE HYDROCHLORIDE 20 MG: 20 TABLET ORAL at 20:34

## 2018-12-16 RX ADMIN — ACETAMINOPHEN 650 MG: 325 TABLET, FILM COATED ORAL at 20:39

## 2018-12-16 RX ADMIN — WARFARIN SODIUM 2 MG: 2 TABLET ORAL at 20:35

## 2018-12-16 RX ADMIN — Medication 10 ML: at 11:02

## 2018-12-16 RX ADMIN — FENTANYL CITRATE 50 MCG: 50 INJECTION INTRAMUSCULAR; INTRAVENOUS at 10:09

## 2018-12-16 ASSESSMENT — PAIN DESCRIPTION - FREQUENCY: FREQUENCY: INTERMITTENT

## 2018-12-16 ASSESSMENT — PAIN DESCRIPTION - PAIN TYPE
TYPE: ACUTE PAIN
TYPE: ACUTE PAIN

## 2018-12-16 ASSESSMENT — PAIN DESCRIPTION - DESCRIPTORS
DESCRIPTORS: ACHING
DESCRIPTORS: SHARP

## 2018-12-16 ASSESSMENT — ENCOUNTER SYMPTOMS
RESPIRATORY NEGATIVE: 1
ABDOMINAL PAIN: 0
BACK PAIN: 0
FACIAL SWELLING: 1
GASTROINTESTINAL NEGATIVE: 1
EYES NEGATIVE: 1
COUGH: 0
SHORTNESS OF BREATH: 0

## 2018-12-16 ASSESSMENT — PAIN SCALES - GENERAL
PAINLEVEL_OUTOF10: 6
PAINLEVEL_OUTOF10: 5
PAINLEVEL_OUTOF10: 3
PAINLEVEL_OUTOF10: 3

## 2018-12-16 ASSESSMENT — PAIN DESCRIPTION - LOCATION
LOCATION: HEAD
LOCATION: FACE;EAR

## 2018-12-16 ASSESSMENT — PAIN DESCRIPTION - ORIENTATION: ORIENTATION: RIGHT

## 2018-12-16 NOTE — ED PROVIDER NOTES
indicated that the status of her father is unknown. She indicated that the status of her other is unknown. SOCIAL HISTORY       Social History   Substance Use Topics    Smoking status: Never Smoker    Smokeless tobacco: Never Used    Alcohol use No     PHYSICAL EXAM     INITIAL VITALS: /89   Pulse 97   Temp 97.7 °F (36.5 °C) (Oral)   Resp 18   Ht 5' 4\" (1.626 m)   Wt (!) 302 lb 0.5 oz (137 kg)   SpO2 98%   BMI 51.84 kg/m²    Physical Exam   Constitutional: She is oriented to person, place, and time. No distress. HENT:   Head: Moderate swelling to right face, tender. No palpable fluctuance or crepitance. Submental tissues are soft. No erythema or warmth. Eyes: Conjunctivae are normal.   Neck: Normal range of motion. Neck supple. Cardiovascular: Normal rate, regular rhythm and normal heart sounds. No murmur heard. Pulmonary/Chest: Effort normal and breath sounds normal.   Abdominal: Soft. There is no tenderness. Musculoskeletal: She exhibits no deformity. Neurological: She is alert and oriented to person, place, and time. Skin: Skin is warm and dry. Capillary refill takes less than 2 seconds. No rash noted. She is not diaphoretic. Nursing note and vitals reviewed. MEDICAL DECISION MAKING:     Reviewed results, mild leukocytosis. CT demonstrates mastoiditis and inflammatory changes to soft tissues. No drainable fluid collection. On reeval, exam unchanged. Pt has multiple abx allergies, started on aztreonam and vancomycin. Discussed with Dr. Shaun Rivera oncall for Dr. Eugenia Chandra, will admit for further therapy and evaluation.      CRITICAL CARE:       PROCEDURES:    Procedures    DIAGNOSTIC RESULTS   EKG:All EKG's are interpreted by the Emergency Department Physician who either signs or Co-signs this chart in the absence of a cardiologist.      RADIOLOGY:All plain film, CT, MRI, and formal ultrasound images (except ED bedside ultrasound) are read by the radiologist,

## 2018-12-17 LAB
ABSOLUTE EOS #: 0 K/UL (ref 0–0.4)
ABSOLUTE IMMATURE GRANULOCYTE: ABNORMAL K/UL (ref 0–0.3)
ABSOLUTE LYMPH #: 0.9 K/UL (ref 1–4.8)
ABSOLUTE MONO #: 1 K/UL (ref 0.1–1.3)
ANION GAP SERPL CALCULATED.3IONS-SCNC: 7 MMOL/L (ref 9–17)
BASOPHILS # BLD: 0 % (ref 0–2)
BASOPHILS ABSOLUTE: 0 K/UL (ref 0–0.2)
BUN BLDV-MCNC: 13 MG/DL (ref 8–23)
BUN/CREAT BLD: ABNORMAL (ref 9–20)
CALCIUM SERPL-MCNC: 8.6 MG/DL (ref 8.6–10.4)
CHLORIDE BLD-SCNC: 100 MMOL/L (ref 98–107)
CO2: 31 MMOL/L (ref 20–31)
CREAT SERPL-MCNC: 0.87 MG/DL (ref 0.5–0.9)
DIFFERENTIAL TYPE: ABNORMAL
EOSINOPHILS RELATIVE PERCENT: 0 % (ref 0–4)
GFR AFRICAN AMERICAN: >60 ML/MIN
GFR NON-AFRICAN AMERICAN: >60 ML/MIN
GFR SERPL CREATININE-BSD FRML MDRD: ABNORMAL ML/MIN/{1.73_M2}
GFR SERPL CREATININE-BSD FRML MDRD: ABNORMAL ML/MIN/{1.73_M2}
GLUCOSE BLD-MCNC: 122 MG/DL (ref 70–99)
HCT VFR BLD CALC: 35.7 % (ref 36–46)
HEMOGLOBIN: 11.6 G/DL (ref 12–16)
IMMATURE GRANULOCYTES: ABNORMAL %
INR BLD: 2.9
LYMPHOCYTES # BLD: 6 % (ref 24–44)
MAGNESIUM: 2 MG/DL (ref 1.6–2.6)
MCH RBC QN AUTO: 31.9 PG (ref 26–34)
MCHC RBC AUTO-ENTMCNC: 32.7 G/DL (ref 31–37)
MCV RBC AUTO: 97.8 FL (ref 80–100)
MONOCYTES # BLD: 8 % (ref 1–7)
NRBC AUTOMATED: ABNORMAL PER 100 WBC
PDW BLD-RTO: 14.6 % (ref 11.5–14.9)
PLATELET # BLD: 169 K/UL (ref 150–450)
PLATELET ESTIMATE: ABNORMAL
PMV BLD AUTO: 9.5 FL (ref 6–12)
POTASSIUM SERPL-SCNC: 3.2 MMOL/L (ref 3.7–5.3)
PROTHROMBIN TIME: 28.3 SEC (ref 9.7–12)
RBC # BLD: 3.65 M/UL (ref 4–5.2)
RBC # BLD: ABNORMAL 10*6/UL
SEG NEUTROPHILS: 86 % (ref 36–66)
SEGMENTED NEUTROPHILS ABSOLUTE COUNT: 11.9 K/UL (ref 1.3–9.1)
SODIUM BLD-SCNC: 138 MMOL/L (ref 135–144)
WBC # BLD: 13.9 K/UL (ref 3.5–11)
WBC # BLD: ABNORMAL 10*3/UL

## 2018-12-17 PROCEDURE — 99223 1ST HOSP IP/OBS HIGH 75: CPT | Performed by: FAMILY MEDICINE

## 2018-12-17 PROCEDURE — 6360000002 HC RX W HCPCS: Performed by: EMERGENCY MEDICINE

## 2018-12-17 PROCEDURE — 6360000002 HC RX W HCPCS: Performed by: FAMILY MEDICINE

## 2018-12-17 PROCEDURE — 6360000002 HC RX W HCPCS: Performed by: INTERNAL MEDICINE

## 2018-12-17 PROCEDURE — 83735 ASSAY OF MAGNESIUM: CPT

## 2018-12-17 PROCEDURE — 1200000000 HC SEMI PRIVATE

## 2018-12-17 PROCEDURE — 85025 COMPLETE CBC W/AUTO DIFF WBC: CPT

## 2018-12-17 PROCEDURE — 80048 BASIC METABOLIC PNL TOTAL CA: CPT

## 2018-12-17 PROCEDURE — 6370000000 HC RX 637 (ALT 250 FOR IP): Performed by: FAMILY MEDICINE

## 2018-12-17 PROCEDURE — 99222 1ST HOSP IP/OBS MODERATE 55: CPT | Performed by: INTERNAL MEDICINE

## 2018-12-17 PROCEDURE — 2580000003 HC RX 258: Performed by: FAMILY MEDICINE

## 2018-12-17 PROCEDURE — 85610 PROTHROMBIN TIME: CPT

## 2018-12-17 PROCEDURE — 2580000003 HC RX 258: Performed by: EMERGENCY MEDICINE

## 2018-12-17 PROCEDURE — 2500000003 HC RX 250 WO HCPCS: Performed by: FAMILY MEDICINE

## 2018-12-17 PROCEDURE — 6370000000 HC RX 637 (ALT 250 FOR IP): Performed by: OTOLARYNGOLOGY

## 2018-12-17 PROCEDURE — 36415 COLL VENOUS BLD VENIPUNCTURE: CPT

## 2018-12-17 PROCEDURE — 2580000003 HC RX 258: Performed by: INTERNAL MEDICINE

## 2018-12-17 RX ORDER — CIPROFLOXACIN AND DEXAMETHASONE 3; 1 MG/ML; MG/ML
4 SUSPENSION/ DROPS AURICULAR (OTIC) 2 TIMES DAILY
Status: DISCONTINUED | OUTPATIENT
Start: 2018-12-17 | End: 2018-12-20 | Stop reason: HOSPADM

## 2018-12-17 RX ORDER — DEXAMETHASONE SODIUM PHOSPHATE 10 MG/ML
10 INJECTION INTRAMUSCULAR; INTRAVENOUS EVERY 8 HOURS
Status: COMPLETED | OUTPATIENT
Start: 2018-12-17 | End: 2018-12-19

## 2018-12-17 RX ADMIN — FUROSEMIDE 40 MG: 40 TABLET ORAL at 08:22

## 2018-12-17 RX ADMIN — VANCOMYCIN HYDROCHLORIDE 1250 MG: 10 INJECTION, POWDER, LYOPHILIZED, FOR SOLUTION INTRAVENOUS at 01:20

## 2018-12-17 RX ADMIN — ACETAMINOPHEN 650 MG: 325 TABLET, FILM COATED ORAL at 05:15

## 2018-12-17 RX ADMIN — ACETAMINOPHEN 650 MG: 325 TABLET, FILM COATED ORAL at 11:18

## 2018-12-17 RX ADMIN — VANCOMYCIN HYDROCHLORIDE 1250 MG: 10 INJECTION, POWDER, LYOPHILIZED, FOR SOLUTION INTRAVENOUS at 14:45

## 2018-12-17 RX ADMIN — DICYCLOMINE HYDROCHLORIDE 20 MG: 20 TABLET ORAL at 23:02

## 2018-12-17 RX ADMIN — CIPROFLOXACIN AND DEXAMETHASONE 4 DROP: 3; 1 SUSPENSION/ DROPS AURICULAR (OTIC) at 22:55

## 2018-12-17 RX ADMIN — POTASSIUM CHLORIDE 40 MEQ: 20 TABLET, EXTENDED RELEASE ORAL at 08:21

## 2018-12-17 RX ADMIN — ACETAMINOPHEN 650 MG: 325 TABLET, FILM COATED ORAL at 01:20

## 2018-12-17 RX ADMIN — CITALOPRAM HYDROBROMIDE 40 MG: 40 TABLET ORAL at 08:22

## 2018-12-17 RX ADMIN — CEFEPIME HYDROCHLORIDE 2 G: 2 INJECTION, POWDER, FOR SOLUTION INTRAVENOUS at 23:02

## 2018-12-17 RX ADMIN — DICYCLOMINE HYDROCHLORIDE 20 MG: 20 TABLET ORAL at 11:15

## 2018-12-17 RX ADMIN — DICYCLOMINE HYDROCHLORIDE 20 MG: 20 TABLET ORAL at 17:33

## 2018-12-17 RX ADMIN — Medication 10 ML: at 23:03

## 2018-12-17 RX ADMIN — ATENOLOL 25 MG: 25 TABLET ORAL at 08:22

## 2018-12-17 RX ADMIN — DICYCLOMINE HYDROCHLORIDE 20 MG: 20 TABLET ORAL at 05:16

## 2018-12-17 RX ADMIN — AZTREONAM 1 G: 1 INJECTION, POWDER, LYOPHILIZED, FOR SOLUTION INTRAMUSCULAR; INTRAVENOUS at 05:16

## 2018-12-17 RX ADMIN — VITAMIN D, TAB 1000IU (100/BT) 1000 UNITS: 25 TAB at 08:22

## 2018-12-17 RX ADMIN — ACETAMINOPHEN 650 MG: 325 TABLET, FILM COATED ORAL at 16:54

## 2018-12-17 RX ADMIN — AZTREONAM 1 G: 1 INJECTION, POWDER, LYOPHILIZED, FOR SOLUTION INTRAMUSCULAR; INTRAVENOUS at 14:17

## 2018-12-17 RX ADMIN — ALLOPURINOL 300 MG: 300 TABLET ORAL at 08:22

## 2018-12-17 RX ADMIN — CYANOCOBALAMIN TAB 1000 MCG 1000 MCG: 1000 TAB at 08:22

## 2018-12-17 RX ADMIN — DULOXETINE 60 MG: 60 CAPSULE, DELAYED RELEASE ORAL at 08:22

## 2018-12-17 RX ADMIN — WARFARIN SODIUM 2 MG: 2 TABLET ORAL at 08:37

## 2018-12-17 RX ADMIN — BUSPIRONE HYDROCHLORIDE 15 MG: 10 TABLET ORAL at 22:55

## 2018-12-17 RX ADMIN — DEXAMETHASONE SODIUM PHOSPHATE 10 MG: 10 INJECTION INTRAMUSCULAR; INTRAVENOUS at 17:32

## 2018-12-17 RX ADMIN — BUSPIRONE HYDROCHLORIDE 15 MG: 10 TABLET ORAL at 08:37

## 2018-12-17 ASSESSMENT — ENCOUNTER SYMPTOMS
SINUS PAIN: 0
COUGH: 0
SINUS PRESSURE: 0
NAUSEA: 0
ALLERGIC/IMMUNOLOGIC NEGATIVE: 1
ABDOMINAL DISTENTION: 0
FACIAL SWELLING: 0
ANAL BLEEDING: 0
ABDOMINAL PAIN: 0
EYES NEGATIVE: 1
SORE THROAT: 1
RHINORRHEA: 0
CHEST TIGHTNESS: 0
BACK PAIN: 1
TROUBLE SWALLOWING: 0
CHOKING: 1

## 2018-12-17 ASSESSMENT — PAIN DESCRIPTION - FREQUENCY: FREQUENCY: INTERMITTENT

## 2018-12-17 ASSESSMENT — PAIN SCALES - GENERAL
PAINLEVEL_OUTOF10: 4
PAINLEVEL_OUTOF10: 3
PAINLEVEL_OUTOF10: 3
PAINLEVEL_OUTOF10: 7
PAINLEVEL_OUTOF10: 0
PAINLEVEL_OUTOF10: 7
PAINLEVEL_OUTOF10: 8
PAINLEVEL_OUTOF10: 2

## 2018-12-17 ASSESSMENT — PAIN DESCRIPTION - PAIN TYPE
TYPE: ACUTE PAIN
TYPE: ACUTE PAIN

## 2018-12-17 ASSESSMENT — PAIN DESCRIPTION - DESCRIPTORS: DESCRIPTORS: ACHING

## 2018-12-17 ASSESSMENT — PAIN DESCRIPTION - ORIENTATION
ORIENTATION: RIGHT
ORIENTATION: RIGHT

## 2018-12-17 ASSESSMENT — PAIN DESCRIPTION - LOCATION
LOCATION: FACE
LOCATION: FACE

## 2018-12-17 NOTE — FLOWSHEET NOTE
12/17/18 1123   Encounter Summary   Services provided to: Patient   Referral/Consult From: Rounding   Continue Visiting (12/17/18)   Complexity of Encounter Low   Length of Encounter 15 minutes   Spiritual Assessment Completed Yes   Routine   Type Initial   Assessment Calm; Approachable   Intervention Provided reading materials/devotional materials   Outcome Receptive;Engaged in conversation;Expressed gratitude

## 2018-12-17 NOTE — CONSULTS
EYE SURGERY      FRACTURE SURGERY      JOINT REPLACEMENT      [facility]    KNEE FUSION      left WITH ROLF IN PLACE.  SKIN BIOPSY      LT. SIDE OF NOSE.  UPPER GASTROINTESTINAL ENDOSCOPY  3/30/2016    stent removal          Admission Meds  No current facility-administered medications on file prior to encounter. Current Outpatient Prescriptions on File Prior to Encounter   Medication Sig Dispense Refill    Cephalexin 500 MG TABS Take 1 capsule by mouth 4 times daily FOR FLARE UP OF CELLULITIS 360 tablet 1    ciprofloxacin (CIPRO) 500 MG tablet Take 1 tablet by mouth 2 times daily Dr Alexandra Garvin. Dis. 180 tablet 1    allopurinol (ZYLOPRIM) 300 MG tablet Take 1 tablet by mouth daily 90 tablet 3    atenolol (TENORMIN) 25 MG tablet Take 1 tablet by mouth daily 90 tablet 3    neomycin-polymyxin-hydrocortisone (CORTISPORIN) 3.5-46722-3 otic solution Place 4 drops into both ears 3 times daily for 10 days Instill into both ears Ear 1 Bottle 0    busPIRone (BUSPAR) 15 MG tablet TAKE 1 TABLET TWICE A  tablet 0    furosemide (LASIX) 80 MG tablet TAKE 1 TABLET DAILY 90 tablet 3    dicyclomine (BENTYL) 20 MG tablet TAKE 1 TABLET EVERY 6 HOURS 360 tablet 1    DULoxetine (CYMBALTA) 60 MG extended release capsule TAKE 1 CAPSULE NIGHTLY 90 capsule 1    citalopram (CELEXA) 40 MG tablet TAKE 1 TABLET DAILY 90 tablet 3    vitamin B-12 (CYANOCOBALAMIN) 1000 MCG tablet Take 1,000 mcg by mouth daily      vitamin D (CHOLECALCIFEROL) 1000 UNIT TABS tablet Take 1,000 Units by mouth daily      warfarin (COUMADIN) 4 MG tablet TAKE 1 TABLET DAILY (Patient taking differently: takes 1/2 of a 4 mg tablet daily.) 90 tablet 2    acetaminophen (TYLENOL) 650 MG CR tablet Take 650 mg by mouth every 8 hours as needed. OTC       Misc. Devices KIT 1 each by Does not apply route continuous Admit to skilled nursing facility for respite care.  1 kit 0           Allergies  Allergies   Allergen Reactions    Morphine

## 2018-12-17 NOTE — H&P
HISTORY and [address]         NAME:  Clara Mabry  MRN: [id number]   YOB: 1940   Date: 12/17/2018   Age: 69 y.o. Gender: female       COMPLAINT AND PRESENT HISTORY:    Chapin Valencia is a 69 yr old female who has mastoiditis and is admitted for IV antibiotics. She is morbidly obese, having sustained multiple complications after having a knee arthroplasty and then infection of prosthesis developed while she was at a nursing home. She had that prosthesis removed and later another put in but again an infection and more complications and the knee never did work right again after that, She walks 25 ft or so with a walker and mostly is in a wheelchair, She gained wt and became wheel chair dependant. She has had mulitiple UTIs in the past, She hs chronic cellulitis anterior tibial area of both legs. Since she has poor mobility going to the Dentist is a chore and she has had some bad molars, One even fell out, and others became ground down and the gingiva grew over them, She had a lot of problems with itching of her ears and her  says she digs in her ears constantly with a Q tip. She developed a fever and chills and sore throat and came to ED, and CT showed, mastoiditis. ID consult was made, and she is on IV Vanco. She has H/O  A Fib/Flutter and is on Coumadin, She has large contusions on upper and lower arms,     She is a retired [employer] , and her  is a retired teacher, Currently they feel they have good ability to provide for her care with no visiting nurse visits.     DIAGNOSTIC RESULTS   Radiology:   EKG:     Labs:  CBC:   Recent Labs      12/16/18   1007  12/17/18   0630   WBC  14.4*  13.9*   HGB  12.1  11.6*   PLT  168  169     BMP:    Recent Labs      12/16/18   1007  12/17/18   0630   NA  140  138   K  3.1*  3.2*   CL  98  100   CO2  31  31   BUN  16  13   CREATININE  0.98*  0.87   GLUCOSE  116*  122*     Hepatic:   Recent Labs

## 2018-12-17 NOTE — PLAN OF CARE
Problem: Nutrition  Goal: Optimal nutrition therapy  Outcome: Ongoing  Nutrition Problem: Biting/chewing difficulty  Intervention: Food and/or Nutrient Delivery: Continue current diet, Start ONS  Nutritional Goals: Provide diet appropriate for chewing ability

## 2018-12-18 LAB
ANION GAP SERPL CALCULATED.3IONS-SCNC: 8 MMOL/L (ref 9–17)
BUN BLDV-MCNC: 13 MG/DL (ref 8–23)
BUN/CREAT BLD: ABNORMAL (ref 9–20)
CALCIUM SERPL-MCNC: 9 MG/DL (ref 8.6–10.4)
CHLORIDE BLD-SCNC: 100 MMOL/L (ref 98–107)
CO2: 29 MMOL/L (ref 20–31)
CREAT SERPL-MCNC: 0.94 MG/DL (ref 0.5–0.9)
GFR AFRICAN AMERICAN: >60 ML/MIN
GFR NON-AFRICAN AMERICAN: 58 ML/MIN
GFR SERPL CREATININE-BSD FRML MDRD: ABNORMAL ML/MIN/{1.73_M2}
GFR SERPL CREATININE-BSD FRML MDRD: ABNORMAL ML/MIN/{1.73_M2}
GLUCOSE BLD-MCNC: 162 MG/DL (ref 70–99)
INR BLD: 4
POTASSIUM SERPL-SCNC: 3.8 MMOL/L (ref 3.7–5.3)
PROTHROMBIN TIME: 39.2 SEC (ref 9.7–12)
SODIUM BLD-SCNC: 137 MMOL/L (ref 135–144)
VANCOMYCIN TROUGH DATE LAST DOSE: ABNORMAL
VANCOMYCIN TROUGH DOSE AMOUNT: ABNORMAL
VANCOMYCIN TROUGH TIME LAST DOSE: 252
VANCOMYCIN TROUGH: 22.8 UG/ML (ref 10–20)

## 2018-12-18 PROCEDURE — G8978 MOBILITY CURRENT STATUS: HCPCS

## 2018-12-18 PROCEDURE — 2580000003 HC RX 258: Performed by: INTERNAL MEDICINE

## 2018-12-18 PROCEDURE — 6370000000 HC RX 637 (ALT 250 FOR IP): Performed by: FAMILY MEDICINE

## 2018-12-18 PROCEDURE — 85610 PROTHROMBIN TIME: CPT

## 2018-12-18 PROCEDURE — 2580000003 HC RX 258: Performed by: FAMILY MEDICINE

## 2018-12-18 PROCEDURE — G8979 MOBILITY GOAL STATUS: HCPCS

## 2018-12-18 PROCEDURE — 80048 BASIC METABOLIC PNL TOTAL CA: CPT

## 2018-12-18 PROCEDURE — 6360000002 HC RX W HCPCS: Performed by: INTERNAL MEDICINE

## 2018-12-18 PROCEDURE — 6360000002 HC RX W HCPCS: Performed by: EMERGENCY MEDICINE

## 2018-12-18 PROCEDURE — 97161 PT EVAL LOW COMPLEX 20 MIN: CPT

## 2018-12-18 PROCEDURE — 99232 SBSQ HOSP IP/OBS MODERATE 35: CPT | Performed by: FAMILY MEDICINE

## 2018-12-18 PROCEDURE — 99233 SBSQ HOSP IP/OBS HIGH 50: CPT | Performed by: INTERNAL MEDICINE

## 2018-12-18 PROCEDURE — 97110 THERAPEUTIC EXERCISES: CPT

## 2018-12-18 PROCEDURE — 2580000003 HC RX 258: Performed by: EMERGENCY MEDICINE

## 2018-12-18 PROCEDURE — 80202 ASSAY OF VANCOMYCIN: CPT

## 2018-12-18 PROCEDURE — 1200000000 HC SEMI PRIVATE

## 2018-12-18 PROCEDURE — 36415 COLL VENOUS BLD VENIPUNCTURE: CPT

## 2018-12-18 PROCEDURE — 6360000002 HC RX W HCPCS: Performed by: FAMILY MEDICINE

## 2018-12-18 RX ORDER — WARFARIN SODIUM 4 MG/1
2 TABLET ORAL DAILY
COMMUNITY

## 2018-12-18 RX ORDER — ALBUTEROL SULFATE 2.5 MG/3ML
2.5 SOLUTION RESPIRATORY (INHALATION) EVERY 6 HOURS PRN
Status: DISCONTINUED | OUTPATIENT
Start: 2018-12-18 | End: 2018-12-20 | Stop reason: HOSPADM

## 2018-12-18 RX ADMIN — Medication 10 ML: at 21:21

## 2018-12-18 RX ADMIN — CYANOCOBALAMIN TAB 1000 MCG 1000 MCG: 1000 TAB at 08:15

## 2018-12-18 RX ADMIN — CIPROFLOXACIN AND DEXAMETHASONE 4 DROP: 3; 1 SUSPENSION/ DROPS AURICULAR (OTIC) at 21:21

## 2018-12-18 RX ADMIN — DEXAMETHASONE SODIUM PHOSPHATE 10 MG: 10 INJECTION INTRAMUSCULAR; INTRAVENOUS at 01:01

## 2018-12-18 RX ADMIN — DICYCLOMINE HYDROCHLORIDE 20 MG: 20 TABLET ORAL at 21:19

## 2018-12-18 RX ADMIN — CIPROFLOXACIN AND DEXAMETHASONE 4 DROP: 3; 1 SUSPENSION/ DROPS AURICULAR (OTIC) at 08:19

## 2018-12-18 RX ADMIN — ATENOLOL 25 MG: 25 TABLET ORAL at 08:15

## 2018-12-18 RX ADMIN — DEXAMETHASONE SODIUM PHOSPHATE 10 MG: 10 INJECTION INTRAMUSCULAR; INTRAVENOUS at 08:15

## 2018-12-18 RX ADMIN — ACETAMINOPHEN 650 MG: 325 TABLET, FILM COATED ORAL at 16:05

## 2018-12-18 RX ADMIN — BUSPIRONE HYDROCHLORIDE 15 MG: 10 TABLET ORAL at 08:17

## 2018-12-18 RX ADMIN — FUROSEMIDE 40 MG: 40 TABLET ORAL at 08:15

## 2018-12-18 RX ADMIN — DEXAMETHASONE SODIUM PHOSPHATE 10 MG: 10 INJECTION INTRAMUSCULAR; INTRAVENOUS at 18:23

## 2018-12-18 RX ADMIN — VANCOMYCIN 1000 MG: 1 INJECTION, SOLUTION INTRAVENOUS at 16:05

## 2018-12-18 RX ADMIN — VANCOMYCIN HYDROCHLORIDE 1250 MG: 10 INJECTION, POWDER, LYOPHILIZED, FOR SOLUTION INTRAVENOUS at 02:52

## 2018-12-18 RX ADMIN — BUSPIRONE HYDROCHLORIDE 15 MG: 10 TABLET ORAL at 21:19

## 2018-12-18 RX ADMIN — ALLOPURINOL 300 MG: 300 TABLET ORAL at 08:15

## 2018-12-18 RX ADMIN — VITAMIN D, TAB 1000IU (100/BT) 1000 UNITS: 25 TAB at 08:15

## 2018-12-18 RX ADMIN — CITALOPRAM HYDROBROMIDE 40 MG: 40 TABLET ORAL at 08:15

## 2018-12-18 RX ADMIN — DULOXETINE 60 MG: 60 CAPSULE, DELAYED RELEASE ORAL at 08:15

## 2018-12-18 RX ADMIN — CEFEPIME HYDROCHLORIDE 2 G: 2 INJECTION, POWDER, FOR SOLUTION INTRAVENOUS at 10:34

## 2018-12-18 RX ADMIN — DICYCLOMINE HYDROCHLORIDE 20 MG: 20 TABLET ORAL at 18:23

## 2018-12-18 RX ADMIN — CEFEPIME HYDROCHLORIDE 2 G: 2 INJECTION, POWDER, FOR SOLUTION INTRAVENOUS at 21:18

## 2018-12-18 RX ADMIN — DICYCLOMINE HYDROCHLORIDE 20 MG: 20 TABLET ORAL at 06:37

## 2018-12-18 RX ADMIN — DICYCLOMINE HYDROCHLORIDE 20 MG: 20 TABLET ORAL at 11:16

## 2018-12-18 RX ADMIN — Medication 10 ML: at 08:17

## 2018-12-18 ASSESSMENT — PAIN SCALES - GENERAL
PAINLEVEL_OUTOF10: 0
PAINLEVEL_OUTOF10: 6
PAINLEVEL_OUTOF10: 3
PAINLEVEL_OUTOF10: 0

## 2018-12-18 ASSESSMENT — PAIN DESCRIPTION - PAIN TYPE: TYPE: CHRONIC PAIN

## 2018-12-18 ASSESSMENT — PAIN DESCRIPTION - LOCATION: LOCATION: HEAD

## 2018-12-18 NOTE — PLAN OF CARE
Problem: Falls - Risk of:  Goal: Absence of physical injury  Absence of physical injury   Outcome: Met This Shift  Safety maintained. No falls this shift. Safety protocols in place. Problem: Risk for Impaired Skin Integrity  Goal: Tissue integrity - skin and mucous membranes  Structural intactness and normal physiological function of skin and  mucous membranes. Outcome: Met This Shift  Skin integrity improved/maintained this shift. See head to toe assessment.

## 2018-12-19 VITALS
WEIGHT: 293 LBS | TEMPERATURE: 98.1 F | RESPIRATION RATE: 12 BRPM | BODY MASS INDEX: 50.02 KG/M2 | HEIGHT: 64 IN | HEART RATE: 82 BPM | SYSTOLIC BLOOD PRESSURE: 124 MMHG | DIASTOLIC BLOOD PRESSURE: 62 MMHG | OXYGEN SATURATION: 98 %

## 2018-12-19 PROBLEM — K11.21 ACUTE PAROTITIS: Status: ACTIVE | Noted: 2018-12-19

## 2018-12-19 LAB
ANION GAP SERPL CALCULATED.3IONS-SCNC: 10 MMOL/L (ref 9–17)
BUN BLDV-MCNC: 21 MG/DL (ref 8–23)
BUN/CREAT BLD: ABNORMAL (ref 9–20)
CALCIUM SERPL-MCNC: 9.2 MG/DL (ref 8.6–10.4)
CHLORIDE BLD-SCNC: 100 MMOL/L (ref 98–107)
CO2: 29 MMOL/L (ref 20–31)
CREAT SERPL-MCNC: 0.98 MG/DL (ref 0.5–0.9)
GFR AFRICAN AMERICAN: >60 ML/MIN
GFR NON-AFRICAN AMERICAN: 55 ML/MIN
GFR SERPL CREATININE-BSD FRML MDRD: ABNORMAL ML/MIN/{1.73_M2}
GFR SERPL CREATININE-BSD FRML MDRD: ABNORMAL ML/MIN/{1.73_M2}
GLUCOSE BLD-MCNC: 171 MG/DL (ref 70–99)
INR BLD: 3.7
POTASSIUM SERPL-SCNC: 4 MMOL/L (ref 3.7–5.3)
PROTHROMBIN TIME: 35.6 SEC (ref 9.7–12)
SODIUM BLD-SCNC: 139 MMOL/L (ref 135–144)

## 2018-12-19 PROCEDURE — 1200000000 HC SEMI PRIVATE

## 2018-12-19 PROCEDURE — 2580000003 HC RX 258: Performed by: INTERNAL MEDICINE

## 2018-12-19 PROCEDURE — 6370000000 HC RX 637 (ALT 250 FOR IP): Performed by: FAMILY MEDICINE

## 2018-12-19 PROCEDURE — 80048 BASIC METABOLIC PNL TOTAL CA: CPT

## 2018-12-19 PROCEDURE — 99232 SBSQ HOSP IP/OBS MODERATE 35: CPT | Performed by: FAMILY MEDICINE

## 2018-12-19 PROCEDURE — 6360000002 HC RX W HCPCS: Performed by: INTERNAL MEDICINE

## 2018-12-19 PROCEDURE — 99232 SBSQ HOSP IP/OBS MODERATE 35: CPT | Performed by: INTERNAL MEDICINE

## 2018-12-19 PROCEDURE — 85610 PROTHROMBIN TIME: CPT

## 2018-12-19 PROCEDURE — 6360000002 HC RX W HCPCS: Performed by: FAMILY MEDICINE

## 2018-12-19 PROCEDURE — 36415 COLL VENOUS BLD VENIPUNCTURE: CPT

## 2018-12-19 RX ORDER — CIPROFLOXACIN AND DEXAMETHASONE 3; 1 MG/ML; MG/ML
4 SUSPENSION/ DROPS AURICULAR (OTIC) 2 TIMES DAILY
Qty: 7.5 ML | Refills: 0 | Status: SHIPPED | OUTPATIENT
Start: 2018-12-19 | End: 2018-12-28

## 2018-12-19 RX ORDER — CLINDAMYCIN HYDROCHLORIDE 300 MG/1
300 CAPSULE ORAL 2 TIMES DAILY
Qty: 14 CAPSULE | Refills: 0 | Status: SHIPPED | OUTPATIENT
Start: 2018-12-19 | End: 2018-12-27 | Stop reason: SDUPTHER

## 2018-12-19 RX ADMIN — DICYCLOMINE HYDROCHLORIDE 20 MG: 20 TABLET ORAL at 17:33

## 2018-12-19 RX ADMIN — CEFEPIME HYDROCHLORIDE 2 G: 2 INJECTION, POWDER, FOR SOLUTION INTRAVENOUS at 21:33

## 2018-12-19 RX ADMIN — DICYCLOMINE HYDROCHLORIDE 20 MG: 20 TABLET ORAL at 09:47

## 2018-12-19 RX ADMIN — DULOXETINE 60 MG: 60 CAPSULE, DELAYED RELEASE ORAL at 09:46

## 2018-12-19 RX ADMIN — BUSPIRONE HYDROCHLORIDE 15 MG: 10 TABLET ORAL at 21:33

## 2018-12-19 RX ADMIN — DICYCLOMINE HYDROCHLORIDE 20 MG: 20 TABLET ORAL at 21:33

## 2018-12-19 RX ADMIN — FUROSEMIDE 40 MG: 40 TABLET ORAL at 09:47

## 2018-12-19 RX ADMIN — VANCOMYCIN 1000 MG: 1 INJECTION, SOLUTION INTRAVENOUS at 15:19

## 2018-12-19 RX ADMIN — CEFEPIME HYDROCHLORIDE 2 G: 2 INJECTION, POWDER, FOR SOLUTION INTRAVENOUS at 10:23

## 2018-12-19 RX ADMIN — CYANOCOBALAMIN TAB 1000 MCG 1000 MCG: 1000 TAB at 09:46

## 2018-12-19 RX ADMIN — CIPROFLOXACIN AND DEXAMETHASONE 4 DROP: 3; 1 SUSPENSION/ DROPS AURICULAR (OTIC) at 09:47

## 2018-12-19 RX ADMIN — ACETAMINOPHEN 650 MG: 325 TABLET, FILM COATED ORAL at 15:22

## 2018-12-19 RX ADMIN — DEXAMETHASONE SODIUM PHOSPHATE 10 MG: 10 INJECTION INTRAMUSCULAR; INTRAVENOUS at 09:47

## 2018-12-19 RX ADMIN — BUSPIRONE HYDROCHLORIDE 15 MG: 10 TABLET ORAL at 09:47

## 2018-12-19 RX ADMIN — CITALOPRAM HYDROBROMIDE 40 MG: 40 TABLET ORAL at 09:46

## 2018-12-19 RX ADMIN — VANCOMYCIN 1000 MG: 1 INJECTION, SOLUTION INTRAVENOUS at 03:32

## 2018-12-19 RX ADMIN — ATENOLOL 25 MG: 25 TABLET ORAL at 09:46

## 2018-12-19 RX ADMIN — DICYCLOMINE HYDROCHLORIDE 20 MG: 20 TABLET ORAL at 06:08

## 2018-12-19 RX ADMIN — ALLOPURINOL 300 MG: 300 TABLET ORAL at 09:46

## 2018-12-19 RX ADMIN — VITAMIN D, TAB 1000IU (100/BT) 1000 UNITS: 25 TAB at 09:46

## 2018-12-19 RX ADMIN — DEXAMETHASONE SODIUM PHOSPHATE 10 MG: 10 INJECTION INTRAMUSCULAR; INTRAVENOUS at 00:30

## 2018-12-19 RX ADMIN — CIPROFLOXACIN AND DEXAMETHASONE 4 DROP: 3; 1 SUSPENSION/ DROPS AURICULAR (OTIC) at 21:33

## 2018-12-19 ASSESSMENT — PAIN SCALES - GENERAL
PAINLEVEL_OUTOF10: 7
PAINLEVEL_OUTOF10: 0

## 2018-12-19 NOTE — CARE COORDINATION
Continuity of Care Form    Patient Name: Gonzalo Simmons   :  1940  MRN:  [id number]    Admit date:  2018  Discharge date:  18    Code Status Order: Full Code   Advance Directives:   Advance Care Flowsheet Documentation     Date/Time Healthcare Directive Type of Healthcare Directive Copy in [address] Agent's Name Healthcare Agent's Phone Number    18 [phone]  Yes, patient has an advance directive for healthcare treatment  Living will;Durable power of  for health care  No, copy requested from family  Healthcare power of   Micaela Henry  [phone]          Admitting Physician:  Diego Manrique MD  PCP: Diego Manrique MD    Discharging Nurse: [facility] Unit/Room#: 2066/2066-01  Discharging Unit Phone Number: [phone]    Emergency Contact:   Extended Emergency Contact Information  Primary Emergency Contact: Dalton Arnettr  Address: [address] [place] [address] [address] [facility] Phone: [phone]  Work Phone: [phone]  Mobile Phone: [phone]  Relation: Spouse  Secondary Emergency Contact: Burgess Mcmillan  Address:  [address] [facility] Phone: [phone]  Work Phone: [phone]  Mobile Phone: [phone]  Relation: Child    Past Surgical History:  Past Surgical History:   Procedure Laterality Date    ABDOMEN SURGERY      CATARACT REMOVAL Bilateral     IOL'S    CATARACT REMOVAL WITH IMPLANT Bilateral      SECTION      X3    CHOLECYSTECTOMY  10/8/15    lap [place]    ERCP  10/10/15    w/photos; sphincterotomy; placement of 10Fr - 9mm CBD stent    EYE SURGERY      FRACTURE SURGERY      JOINT REPLACEMENT      [facility]    KNEE FUSION      left WITH ROLF IN PLACE.  SKIN BIOPSY      LT. SIDE OF NOSE.     UPPER GASTROINTESTINAL ENDOSCOPY  3/30/2016    stent removal       Immunization History:   Immunization

## 2018-12-19 NOTE — PLAN OF CARE
Problem: Falls - Risk of:  Goal: Will remain free from falls  Will remain free from falls   Outcome: Met This Shift  Pt assessed as a fall risk this shift. Remains free from falls and accidental injury at this time. Fall precautions in place, including falling star sign and fall risk band on pt. Floor free from obstacles, and bed is locked and in lowest position. Adequate lighting provided. Pt encouraged to call before getting OOB for any need. Bed alarm activated. Will continue to monitor needs during hourly rounding, and reinforce education on use of call light. Problem: Risk for Impaired Skin Integrity  Goal: Tissue integrity - skin and mucous membranes  Structural intactness and normal physiological function of skin and  mucous membranes. Outcome: Ongoing  Skin assessment performed. See head to toe assessment. Will continue to monitor. Problem: Nutrition  Goal: Optimal nutrition therapy  Outcome: Ongoing      Problem: Pain:  Goal: Pain level will decrease  Pain level will decrease   Outcome: Ongoing  Pt medicated with pain medication prn. Assessed all pain characteristics including level, type, location, frequency, and onset. Non-pharmacologic interventions offered to pt as well. Pt states pain is tolerable at this time. Will continue to monitor.

## 2018-12-20 LAB
ANION GAP SERPL CALCULATED.3IONS-SCNC: 7 MMOL/L (ref 9–17)
BUN BLDV-MCNC: 32 MG/DL (ref 8–23)
BUN/CREAT BLD: ABNORMAL (ref 9–20)
CALCIUM SERPL-MCNC: 9.4 MG/DL (ref 8.6–10.4)
CHLORIDE BLD-SCNC: 101 MMOL/L (ref 98–107)
CO2: 31 MMOL/L (ref 20–31)
CREAT SERPL-MCNC: 1.14 MG/DL (ref 0.5–0.9)
GFR AFRICAN AMERICAN: 56 ML/MIN
GFR NON-AFRICAN AMERICAN: 46 ML/MIN
GFR SERPL CREATININE-BSD FRML MDRD: ABNORMAL ML/MIN/{1.73_M2}
GFR SERPL CREATININE-BSD FRML MDRD: ABNORMAL ML/MIN/{1.73_M2}
GLUCOSE BLD-MCNC: 174 MG/DL (ref 70–99)
INR BLD: 3.3
POTASSIUM SERPL-SCNC: 4.7 MMOL/L (ref 3.7–5.3)
PROTHROMBIN TIME: 32.2 SEC (ref 9.7–12)
SODIUM BLD-SCNC: 139 MMOL/L (ref 135–144)

## 2018-12-20 PROCEDURE — 6370000000 HC RX 637 (ALT 250 FOR IP): Performed by: FAMILY MEDICINE

## 2018-12-20 PROCEDURE — 80048 BASIC METABOLIC PNL TOTAL CA: CPT

## 2018-12-20 PROCEDURE — 85610 PROTHROMBIN TIME: CPT

## 2018-12-20 PROCEDURE — 99239 HOSP IP/OBS DSCHRG MGMT >30: CPT | Performed by: FAMILY MEDICINE

## 2018-12-20 PROCEDURE — 6360000002 HC RX W HCPCS: Performed by: INTERNAL MEDICINE

## 2018-12-20 PROCEDURE — 2580000003 HC RX 258: Performed by: INTERNAL MEDICINE

## 2018-12-20 PROCEDURE — 36415 COLL VENOUS BLD VENIPUNCTURE: CPT

## 2018-12-20 RX ADMIN — VANCOMYCIN 1000 MG: 1 INJECTION, SOLUTION INTRAVENOUS at 03:26

## 2018-12-20 RX ADMIN — DICYCLOMINE HYDROCHLORIDE 20 MG: 20 TABLET ORAL at 06:16

## 2018-12-20 NOTE — DISCHARGE SUMMARY
uncomplicated    Consults:  ID and ENT    Significant Diagnostic Studies: as above, and as follows: see chart    Treatments: as above    Disposition: home    Discharged Condition: Stable    Follow Up: Franklyn Beach MD in one week    Discharge Medications:    Freeman Capellan   Home Medication Instructions [id number]    Printed on:12/20/18 1083   Medication Information                      acetaminophen (TYLENOL) 650 MG CR tablet  Take 650 mg by mouth every 8 hours as needed. OTC              allopurinol (ZYLOPRIM) 300 MG tablet  Take 1 tablet by mouth daily             atenolol (TENORMIN) 25 MG tablet  Take 1 tablet by mouth daily             busPIRone (BUSPAR) 15 MG tablet  TAKE 1 TABLET TWICE A DAY             Cephalexin 500 MG TABS  Take 1 capsule by mouth 4 times daily FOR FLARE UP OF CELLULITIS             ciprofloxacin (CIPRO) 500 MG tablet  Take 1 tablet by mouth 2 times daily Dr Gurmeet Sunshine. Dis. ciprofloxacin-dexamethasone (CIPRODEX) 0.3-0.1 % otic suspension  Place 4 drops into both ears 2 times daily for 7 days             citalopram (CELEXA) 40 MG tablet  TAKE 1 TABLET DAILY             clindamycin (CLEOCIN) 300 MG capsule  Take 1 capsule by mouth 2 times daily for 7 days             dicyclomine (BENTYL) 20 MG tablet  TAKE 1 TABLET EVERY 6 HOURS             DULoxetine (CYMBALTA) 60 MG extended release capsule  TAKE 1 CAPSULE NIGHTLY             furosemide (LASIX) 80 MG tablet  TAKE 1 TABLET DAILY             Misc. Devices KIT  1 each by Does not apply route continuous Admit to skilled nursing facility for respite care.              nystatin (MYCOSTATIN) 277790 UNIT/ML suspension  Take 5 mLs by mouth 4 times daily             vitamin B-12 (CYANOCOBALAMIN) 1000 MCG tablet  Take 1,000 mcg by mouth daily             vitamin D (CHOLECALCIFEROL) 1000 UNIT TABS tablet  Take 1,000 Units by mouth daily             warfarin (COUMADIN) 4 MG tablet  Take 2 mg by mouth daily

## 2018-12-21 NOTE — PROGRESS NOTES
Breath Sounds: Diminished  RR[de-identified] 18  Pulse Sat: 93%  Home Meds: None    · Bronchodilator assessment at level: 1  · []    Home Level  BRONCHODILATOR ASSESSMENT SCORE  Score 0 1 2 3 4 5   Breath Sounds   []  Patient Baseline [x]  No Wheeze good aeration []  Faint, scattered wheezing, good aeration []  Expiratory Wheezing and or moderately diminished []  Insp/Exp wheeze and/or very diminished []  Insp/Exp and/ or marked distress   Respiratory Rate   []  Patient Baseline [x]  Less than 20 []  Less than 20 []  20-25 []  Greater than 25 []  Greater than 25   Peak flow % of Pred or PB [x]  NA   []  Greater than 90%  []  81-90% []  71-80% []  Less than or equal to 70%  or unable to perform []  Unable due to Respiratory Distress   Dyspnea re []  Patient Baseline [x]  No SOB []  No SOB []  SOB on exertion []  SOB min activity []  At rest/acute   e FEV% Predicted       []  NA []  Above 69%  [x]  Unable []  Above 60-69%  []  Unable []  Above 50-59%  []  Unable []  Above 35-49%  []  Unable []  Less than 35%  []  Unable
CLINICAL PHARMACY NOTE: MEDS TO [address] Select Patient?: Yes  Total # of Prescriptions Filled: 1   The following medications were delivered to the patient:  · NO MEDS DELIVERED  Total # of Interventions Completed: 0  Time Spent (min): 0    Additional Documentation:  12/20/18: Viviana Edge ON PREVIOUS (12/19) PROGRESS NOTE. PT'S SON CAME TO THE PHARMACY TODAY TO  AND PAY FOR THE PT'S MEDICATION (CLINDAMYCIN).
CLINICAL PHARMACY NOTE: MEDS TO [address] Select Patient?: Yes  Total # of Prescriptions Filled: 1   The following medications were delivered to the patient:  · NO MEDS DELIVERED  Total # of Interventions Completed: 1  Time Spent (min): 30    Additional Documentation:  1 MEDICATION (CLINDAMYCIN) FILLED AND BROUGHT TO PT'S ROOM ON 12/19, BUT PT HAD COPAY AND DID NOT HAVE THE MEANS TO PAY FOR THE MED TODAY. PT SAID THAT HER , RAVINDER APPIAH, WOULD COME IN TO THE OUTPATIENT PHARMACY TOMORROW (12/20) TO  THE MED. CALLED [facility] FOR INSURANCE INFO. CIPRODEX TOO SOON FOR REFILL, LAST FILL 12-11-18, PUT RX ON FILE.
Nutrition Assessment    Type and Reason for Visit: Positive Nutrition Screen (Difficulty chewing or swallowing)    Nutrition Recommendations: Continue General diet. Start Ensure High Protein twice a day. Nutrition Assessment: Patient is nutritionally compromised as evidence by poor p.o intakes and difficulty chewing. Patient is at further risk for compromise due to poor dentition and broken teeth. Will start Ensure High Protein twice a day. Malnutrition Assessment:  · Malnutrition Status: At risk for malnutrition  · Context: Chronic illness  · Findings of the 6 clinical characteristics of malnutrition (Minimum of 2 out of 6 clinical characteristics is required to make the diagnosis of moderate or severe Protein Calorie Malnutrition based on AND/ASPEN Guidelines):  1. Energy Intake-Less than 75% of estimated energy requirement for greater than 7 days,      2. Weight Loss-No significant weight loss,    3. Fat Loss-Mild subcutaneous fat loss,    4. Muscle Loss-Unable to assess (large habitus),    5. Fluid Accumulation-Mild fluid accumulation, Extremities  6.  Strength-Not measured    Nutrition Risk Level: Moderate    Nutrient Needs:  · Estimated Daily Total Kcal: 1500 calories based on 11 kcal/kg of admission weight  · Estimated Daily Protein (g): 65-71 gm of protein/day based on 1.2-1.3 gm of admission weight    Nutrition Diagnosis:   · Problem: Biting/chewing difficulty  · Etiology: related to Partial or complete edentulism     Signs and symptoms:  as evidenced by Ill-fitting, broken, or lack of dentures    Objective Information:  · Nutrition-Focused Physical Findings: +2 BLE edema, facial swelling.  Poor dentition, broken teeth  · Current Nutrition Therapies:  · Oral Diet Orders: General   · Oral Diet intake: 1-25%  · Anthropometric Measures:  · Ht: 5' 4\" (162.6 cm)   · Current Body Wt: 302 lb (137 kg)  · Admission Body Wt: 302 lb (137 kg)  · Ideal Body Wt: 120 lb (54.4 kg), % Ideal Body 252%  · BMI
function, and clinical response. Thank you for the consult. Will continue to follow.    Adry Mccall RP  12/16/2018  12:53 PM
Antibiotics     Ceftriaxone         Social   Social History   Substance Use Topics    Smoking status: Never Smoker    Smokeless tobacco: Never Used    Alcohol use No             Family History   Problem Relation Age of Onset    Heart Disease Other     High Blood Pressure Other     Stroke Other     High Blood Pressure Father     Heart Disease Father     Stroke Father           Review of Systems  As per [place]   Denies cough / sputum. Denies chest pain, palpitations. Denies n / v / abd pain. No diarrhea. Denies focal weakness,   Other than above 10 systems reviewed were negative . Tolerating antibiotics. Physical Exam  /72   Pulse 87   Temp 98.3 °F (36.8 °C) (Oral)   Resp 18   Ht 5' 4\" (1.626 m)   Wt (!) 302 lb 0.5 oz (137 kg)   SpO2 97%   BMI 51.84 kg/m²           General Appearance: alert and oriented to person, place and time, well-developed and well-nourished, in no acute distress  Skin: warm and dry, no rash   Lower extremities venous stasis dermatitis ,left shin mild erythema. Head: normocephalic and atraumatic  Eyes: pupils equal, round, and reactive to light  ENT: right facial swelling and tenderness improved ,no fluctuation. Throat erythema with no exudate  ,no ulcers or dental caries ,no trismus . No mastoids tenderness .     Neck: neck supple and non tender ,right neck swelling and tenderness  Pulmonary/Chest: clear to auscultation bilaterally- no wheezes, rales or rhonchi, normal air movement, no respiratory distress  Cardiovascular: normal rate, regular rhythm, normal S1 and S2, no murmurs  Abdomen: soft, non-tender, non-distended  Extremities: no cyanosis, clubbing   Neurologic:  muscle strength normal    Data Review:    Recent Labs      12/17/18   0630   WBC  13.9*   HGB  11.6*   HCT  35.7*   MCV  97.8   PLT  169     Recent Labs      12/17/18   0630  12/18/18   0607  12/19/18   0528   NA  138  137  139   K  3.2*  3.8  4.0   CL  100  100  100   CO2  31  29  29
Devices KIT 1 each by Does not apply route continuous Admit to skilled nursing facility for respite care. 6/1/17   Kush Catalan MD       Allergies:  Morphine; Pcn [penicillins]; Sulfa antibiotics; and Ceftriaxone    Social History:  The patient currently lives at home    TOBACCO:   reports that she has never smoked. She has never used smokeless tobacco.  ETOH:   reports that she does not drink alcohol. Review of Systems - General ROS: positive for  - morbid obesity  Psychological ROS: positive for - anxiety and depression  Ophthalmic ROS: positive for - uses glasses  ENT ROS: positive for - oral lesions and facial swelling  Endocrine ROS: negative  Respiratory ROS: negative  Cardiovascular ROS: positive for - irregular heartbeat  Gastrointestinal ROS: negative  Musculoskeletal ROS: positive for - joint stiffness and muscular weakness  Neurological ROS: negative      Family History:      Family History   Problem Relation Age of Onset    Heart Disease Other     High Blood Pressure Other     Stroke Other     High Blood Pressure Father     Heart Disease Father     Stroke Father        PHYSICAL EXAM:    /66   Pulse 91   Temp 98.9 °F (37.2 °C)   Resp 20   Ht 5' 4\" (1.626 m)   Wt (!) 302 lb 0.5 oz (137 kg)   SpO2 98%   BMI 51.84 kg/m²     General appearance: No apparent distress appears stated age and cooperative. Obese. HEENT Normal cephalic, atraumatic without obvious deformity. Pupils equal, round, and reactive to light. Extra ocular muscles intact. Conjunctivae/corneas clear. Right cheek swollen. Neck: Supple, No jugular venous distention/bruits. Trachea midline without thyromegaly or adenopathy with full range of motion. Lungs: Clear to auscultation, bilaterally without Rales/Wheezes/Rhonchi with good respiratory effort.   Heart: Regular rate and rhythm with Normal S1/S2 without murmurs, rubs or gallops, point of maximum impulse non-displaced  Abdomen: Soft, non-tender or non-distended
Range    Vancomycin Tr 22.8 () 10.0 - 20.0 ug/mL    Vancomycin Trough Dose amount 1250MG     Vancomycin Trough Date last dose 12,182,018     Vancomycin Trough Time last dose 252      -----------------------------------------------------------------  RAD:  EKG:  Micro:     Assessment & Plan:    Patient Active Problem List:     Obesity, morbid (more than 100 lbs over ideal weight or BMI > 40) ([facility])     HTN (hypertension)     Cellulitis of leg     Lymphedema of leg     Stasis dermatitis     Dyslipidemia     CKD (chronic kidney disease)     Depression     Anxiety     Gout     Chronic calculous cholecystitis     Morbid obesity with BMI of 50.0-59.9, adult ([facility])     CKD (chronic kidney disease) stage 3, GFR 30-59 ml/min ([facility])     Benign essential HTN     Hypokalemia     Status post laparoscopic cholecystectomy     Bile leak     Chronic cholecystitis with calculus     Stones common duct     Idiopathic gout     Lymphedema of both lower extremities     Venous stasis dermatitis of both lower extremities     Essential hypertension     Cardiac arrhythmia     Atrial flutter ([facility])     Recurrent major depressive disorder, in partial remission ([facility] [address] [address]     Mastoiditis of right side     Cellulitis of face           Plan:  Patient admitted with Parotitis - improved. Okay to D/C home tomorrow. Clinda sent to the pharmacy & Cipro that she has at home, both for 7 days. Ciprodex for ears for 7 days. Continue current treatments. Complete orders per chart.     See orders   Disposition:    Electronically signed by Xavier Sinclair MD on 12/19/2018 at 5:50 AM
Short term goals: 10 visits  Short term goal 1: Pt Clarissa for bed mobility  Short term goal 2: Pt modA for transfer to chair  Short term goal 3: Pt participate in 30 minutes ther ex to increase mobility and strength  Short term goal 4: Assess gait when appropriate  Patient Goals   Patient goals :  To return home       Therapy Time   Individual Concurrent Group Co-treatment   Time In 1415         Time Out 1440         Minutes 25         Timed Code Treatment Minutes: [facility], PT
gout     Lymphedema of both lower extremities     Venous stasis dermatitis of both lower extremities     Essential hypertension     Cardiac arrhythmia     Atrial flutter (HCC)     Recurrent major depressive disorder, in partial remission ([facility]     Mastoiditis of right side     Cellulitis of face     Acute parotitis           Plan:  Okay to D/C home on Clinda & Cipro for 7 days. Ciprodex for ears for 7 days. Nystatin for 7 days. Complete orders per chart. Follow up next week in the office.     See orders   Disposition:    Electronically signed by Xavier Sinclair MD on 12/20/2018 at 5:52 AM

## 2019-01-03 ENCOUNTER — HOSPITAL ENCOUNTER (OUTPATIENT)
Age: 79
Setting detail: SPECIMEN
Discharge: HOME | End: 2019-01-03
Payer: MEDICARE

## 2019-01-03 LAB
INR BLD: 1.6
PROTHROMBIN TIME: 16.6 SEC (ref 9–12)

## 2019-01-10 ENCOUNTER — HOSPITAL ENCOUNTER (OUTPATIENT)
Age: 79
Setting detail: SPECIMEN
Discharge: HOME | End: 2019-01-10
Payer: MEDICARE

## 2019-01-10 LAB
INR BLD: 2.2
PROTHROMBIN TIME: 22.2 SEC (ref 9–12)

## 2019-01-31 ENCOUNTER — HOSPITAL ENCOUNTER (OUTPATIENT)
Age: 79
Setting detail: SPECIMEN
Discharge: HOME | End: 2019-01-31
Payer: MEDICARE

## 2019-01-31 LAB
INR BLD: 1.3
PROTHROMBIN TIME: 13.7 SEC (ref 9–12)

## 2019-02-07 ENCOUNTER — HOSPITAL ENCOUNTER (OUTPATIENT)
Age: 79
Setting detail: SPECIMEN
Discharge: HOME | End: 2019-02-07
Payer: MEDICARE

## 2019-02-07 LAB
INR BLD: 1.9
PROTHROMBIN TIME: 18.7 SEC (ref 9–12)

## 2019-03-07 ENCOUNTER — HOSPITAL ENCOUNTER (OUTPATIENT)
Age: 79
Setting detail: SPECIMEN
Discharge: HOME | End: 2019-03-07
Payer: MEDICARE

## 2019-03-07 LAB
INR BLD: 1.8
PROTHROMBIN TIME: 18 SEC (ref 9–12)

## 2019-04-08 ENCOUNTER — HOSPITAL ENCOUNTER (OUTPATIENT)
Age: 79
Setting detail: SPECIMEN
Discharge: HOME | End: 2019-04-08
Payer: MEDICARE

## 2019-04-08 LAB
INR BLD: 1.4
PROTHROMBIN TIME: 14.6 SEC (ref 9–12)

## 2019-04-26 ENCOUNTER — HOSPITAL ENCOUNTER (OUTPATIENT)
Age: 79
Setting detail: SPECIMEN
Discharge: HOME | End: 2019-04-26
Payer: MEDICARE

## 2019-04-26 LAB
INR BLD: 1.6
PROTHROMBIN TIME: 16.2 SEC (ref 9–12)

## 2019-05-02 ENCOUNTER — HOSPITAL ENCOUNTER (OUTPATIENT)
Age: 79
Setting detail: SPECIMEN
Discharge: HOME | End: 2019-05-02
Payer: MEDICARE

## 2019-05-02 DIAGNOSIS — I10 ESSENTIAL HYPERTENSION: ICD-10-CM

## 2019-05-02 DIAGNOSIS — E55.9 VITAMIN D DEFICIENCY: ICD-10-CM

## 2019-05-02 DIAGNOSIS — E78.5 DYSLIPIDEMIA: ICD-10-CM

## 2019-05-02 LAB
ABSOLUTE EOS #: 0.23 K/UL (ref 0–0.44)
ABSOLUTE IMMATURE GRANULOCYTE: <0.03 K/UL (ref 0–0.3)
ABSOLUTE LYMPH #: 1.21 K/UL (ref 1.1–3.7)
ABSOLUTE MONO #: 0.49 K/UL (ref 0.1–1.2)
ALBUMIN SERPL-MCNC: 3.5 G/DL (ref 3.5–5.2)
ALBUMIN/GLOBULIN RATIO: 1.1 (ref 1–2.5)
ALP BLD-CCNC: 89 U/L (ref 35–104)
ALT SERPL-CCNC: 13 U/L (ref 5–33)
ANION GAP SERPL CALCULATED.3IONS-SCNC: 13 MMOL/L (ref 9–17)
AST SERPL-CCNC: 14 U/L
BASOPHILS # BLD: 1 % (ref 0–2)
BASOPHILS ABSOLUTE: 0.05 K/UL (ref 0–0.2)
BILIRUB SERPL-MCNC: 0.3 MG/DL (ref 0.3–1.2)
BILIRUBIN DIRECT: 0.09 MG/DL
BILIRUBIN, INDIRECT: 0.21 MG/DL (ref 0–1)
BUN BLDV-MCNC: 16 MG/DL (ref 8–23)
CALCIUM SERPL-MCNC: 9.6 MG/DL (ref 8.6–10.4)
CHLORIDE BLD-SCNC: 99 MMOL/L (ref 98–107)
CHOLESTEROL/HDL RATIO: 4.2
CHOLESTEROL: 180 MG/DL
CO2: 31 MMOL/L (ref 20–31)
CREAT SERPL-MCNC: 1.02 MG/DL (ref 0.5–0.9)
DIFFERENTIAL TYPE: ABNORMAL
EOSINOPHILS RELATIVE PERCENT: 4 % (ref 1–4)
GFR AFRICAN AMERICAN: >60 ML/MIN
GFR NON-AFRICAN AMERICAN: 52 ML/MIN
GFR SERPL CREATININE-BSD FRML MDRD: ABNORMAL ML/MIN/{1.73_M2}
GFR SERPL CREATININE-BSD FRML MDRD: ABNORMAL ML/MIN/{1.73_M2}
GLUCOSE BLD-MCNC: 101 MG/DL (ref 70–99)
HCT VFR BLD CALC: 41.5 % (ref 36.3–47.1)
HDLC SERPL-MCNC: 43 MG/DL
HEMOGLOBIN: 12.6 G/DL (ref 11.9–15.1)
IMMATURE GRANULOCYTES: 0 %
LDL CHOLESTEROL: 105 MG/DL (ref 0–130)
LYMPHOCYTES # BLD: 19 % (ref 24–43)
MCH RBC QN AUTO: 30.9 PG (ref 25.2–33.5)
MCHC RBC AUTO-ENTMCNC: 30.4 G/DL (ref 28.4–34.8)
MCV RBC AUTO: 101.7 FL (ref 82.6–102.9)
MONOCYTES # BLD: 8 % (ref 3–12)
NRBC AUTOMATED: 0 PER 100 WBC
PDW BLD-RTO: 14 % (ref 11.8–14.4)
PLATELET # BLD: 212 K/UL (ref 138–453)
PLATELET ESTIMATE: ABNORMAL
PMV BLD AUTO: 11.9 FL (ref 8.1–13.5)
POTASSIUM SERPL-SCNC: 3.4 MMOL/L (ref 3.7–5.3)
RBC # BLD: 4.08 M/UL (ref 3.95–5.11)
RBC # BLD: ABNORMAL 10*6/UL
SEG NEUTROPHILS: 68 % (ref 36–65)
SEGMENTED NEUTROPHILS ABSOLUTE COUNT: 4.42 K/UL (ref 1.5–8.1)
SODIUM BLD-SCNC: 143 MMOL/L (ref 135–144)
TOTAL PROTEIN: 6.6 G/DL (ref 6.4–8.3)
TRIGL SERPL-MCNC: 162 MG/DL
VITAMIN D 25-HYDROXY: 34.5 NG/ML (ref 30–100)
VLDLC SERPL CALC-MCNC: ABNORMAL MG/DL (ref 1–30)
WBC # BLD: 6.4 K/UL (ref 3.5–11.3)
WBC # BLD: ABNORMAL 10*3/UL

## 2019-05-14 ENCOUNTER — HOSPITAL ENCOUNTER (OUTPATIENT)
Age: 79
Setting detail: SPECIMEN
Discharge: HOME | End: 2019-05-14
Payer: MEDICARE

## 2019-05-14 LAB
INR BLD: 3
PROTHROMBIN TIME: 29.1 SEC (ref 9–12)

## 2019-06-03 ENCOUNTER — HOSPITAL ENCOUNTER (OUTPATIENT)
Age: 79
Setting detail: SPECIMEN
Discharge: HOME | End: 2019-06-03
Payer: MEDICARE

## 2019-06-05 ENCOUNTER — HOSPITAL ENCOUNTER (OUTPATIENT)
Age: 79
Setting detail: SPECIMEN
Discharge: HOME | End: 2019-06-05
Payer: MEDICARE

## 2019-06-05 LAB
INR BLD: 2.7
PROTHROMBIN TIME: 26.4 SEC (ref 9–12)

## 2019-06-21 ENCOUNTER — HOSPITAL ENCOUNTER (OUTPATIENT)
Age: 79
Setting detail: SPECIMEN
Discharge: HOME | End: 2019-06-21
Payer: MEDICARE

## 2019-06-21 LAB
INR BLD: 3.4
PROTHROMBIN TIME: 32.6 SEC (ref 9–12)

## 2019-07-11 ENCOUNTER — HOSPITAL ENCOUNTER (OUTPATIENT)
Age: 79
Setting detail: SPECIMEN
Discharge: HOME | End: 2019-07-11
Payer: MEDICARE

## 2019-07-11 LAB
INR BLD: 3
PROTHROMBIN TIME: 28.7 SEC (ref 9–12)

## 2019-07-18 ENCOUNTER — HOSPITAL ENCOUNTER (OUTPATIENT)
Dept: CARDIAC CATH/INVASIVE PROCEDURES | Age: 79
Setting detail: OBSERVATION
Discharge: HOME | End: 2019-07-20
Attending: INTERNAL MEDICINE | Admitting: INTERNAL MEDICINE
Payer: MEDICARE

## 2019-07-18 DIAGNOSIS — F32.A DEPRESSION, UNSPECIFIED DEPRESSION TYPE: ICD-10-CM

## 2019-07-18 DIAGNOSIS — I10 ESSENTIAL HYPERTENSION: ICD-10-CM

## 2019-07-18 DIAGNOSIS — F41.9 ANXIETY: ICD-10-CM

## 2019-07-18 DIAGNOSIS — I48.3 TYPICAL ATRIAL FLUTTER (HCC): ICD-10-CM

## 2019-07-18 PROBLEM — I48.4 ATYPICAL ATRIAL FLUTTER (HCC): Status: ACTIVE | Noted: 2019-07-18

## 2019-07-18 LAB
GFR NON-AFRICAN AMERICAN: 43 ML/MIN
GFR SERPL CREATININE-BSD FRML MDRD: 53 ML/MIN
GFR SERPL CREATININE-BSD FRML MDRD: ABNORMAL ML/MIN/{1.73_M2}
GLUCOSE BLD-MCNC: 116 MG/DL (ref 74–100)
POC CHLORIDE: 102 MMOL/L (ref 98–107)
POC CREATININE: 1.2 MG/DL (ref 0.51–1.19)
POC HEMATOCRIT: 49 % (ref 36–46)
POC HEMOGLOBIN: 16.5 G/DL (ref 12–16)
POC INR: 2.6
POC POTASSIUM: 3.2 MMOL/L (ref 3.5–4.5)
POC SODIUM: 140 MMOL/L (ref 138–146)
PROTHROMBIN TIME, POC: 29.6 SEC (ref 10.4–14.2)

## 2019-07-18 PROCEDURE — 2500000003 HC RX 250 WO HCPCS

## 2019-07-18 PROCEDURE — 82565 ASSAY OF CREATININE: CPT

## 2019-07-18 PROCEDURE — G0378 HOSPITAL OBSERVATION PER HR: HCPCS

## 2019-07-18 PROCEDURE — C1731 CATH, EP, 20 OR MORE ELEC: HCPCS

## 2019-07-18 PROCEDURE — 6360000002 HC RX W HCPCS

## 2019-07-18 PROCEDURE — 85610 PROTHROMBIN TIME: CPT

## 2019-07-18 PROCEDURE — 85014 HEMATOCRIT: CPT

## 2019-07-18 PROCEDURE — 2709999900 HC NON-CHARGEABLE SUPPLY

## 2019-07-18 PROCEDURE — 82435 ASSAY OF BLOOD CHLORIDE: CPT

## 2019-07-18 PROCEDURE — 84295 ASSAY OF SERUM SODIUM: CPT

## 2019-07-18 PROCEDURE — 84132 ASSAY OF SERUM POTASSIUM: CPT

## 2019-07-18 PROCEDURE — C1730 CATH, EP, 19 OR FEW ELECT: HCPCS

## 2019-07-18 PROCEDURE — 6370000000 HC RX 637 (ALT 250 FOR IP): Performed by: INTERNAL MEDICINE

## 2019-07-18 PROCEDURE — C1894 INTRO/SHEATH, NON-LASER: HCPCS

## 2019-07-18 PROCEDURE — 93005 ELECTROCARDIOGRAM TRACING: CPT | Performed by: INTERNAL MEDICINE

## 2019-07-18 PROCEDURE — 82947 ASSAY GLUCOSE BLOOD QUANT: CPT

## 2019-07-18 PROCEDURE — 93653 COMPRE EP EVAL TX SVT: CPT | Performed by: INTERNAL MEDICINE

## 2019-07-18 PROCEDURE — 93613 INTRACARDIAC EPHYS 3D MAPG: CPT | Performed by: INTERNAL MEDICINE

## 2019-07-18 PROCEDURE — C1732 CATH, EP, DIAG/ABL, 3D/VECT: HCPCS

## 2019-07-18 PROCEDURE — 7100000011 HC PHASE II RECOVERY - ADDTL 15 MIN

## 2019-07-18 PROCEDURE — 7100000010 HC PHASE II RECOVERY - FIRST 15 MIN

## 2019-07-18 RX ORDER — BUSPIRONE HYDROCHLORIDE 15 MG/1
15 TABLET ORAL 2 TIMES DAILY
Status: DISCONTINUED | OUTPATIENT
Start: 2019-07-18 | End: 2019-07-20 | Stop reason: HOSPADM

## 2019-07-18 RX ORDER — WARFARIN SODIUM 2 MG/1
2 TABLET ORAL DAILY
Status: DISCONTINUED | OUTPATIENT
Start: 2019-07-18 | End: 2019-07-20 | Stop reason: HOSPADM

## 2019-07-18 RX ORDER — POTASSIUM CHLORIDE 20 MEQ/1
40 TABLET, EXTENDED RELEASE ORAL ONCE
Status: DISCONTINUED | OUTPATIENT
Start: 2019-07-18 | End: 2019-07-18

## 2019-07-18 RX ORDER — DICYCLOMINE HYDROCHLORIDE 10 MG/1
20 CAPSULE ORAL DAILY
Status: DISCONTINUED | OUTPATIENT
Start: 2019-07-19 | End: 2019-07-20 | Stop reason: HOSPADM

## 2019-07-18 RX ORDER — ALLOPURINOL 300 MG/1
300 TABLET ORAL DAILY
Status: DISCONTINUED | OUTPATIENT
Start: 2019-07-19 | End: 2019-07-20 | Stop reason: HOSPADM

## 2019-07-18 RX ORDER — POTASSIUM CHLORIDE 20 MEQ/1
40 TABLET, EXTENDED RELEASE ORAL PRN
Status: DISCONTINUED | OUTPATIENT
Start: 2019-07-18 | End: 2019-07-20 | Stop reason: HOSPADM

## 2019-07-18 RX ORDER — FUROSEMIDE 40 MG/1
80 TABLET ORAL DAILY
Status: DISCONTINUED | OUTPATIENT
Start: 2019-07-19 | End: 2019-07-20 | Stop reason: HOSPADM

## 2019-07-18 RX ORDER — CHOLECALCIFEROL (VITAMIN D3) 125 MCG
1000 CAPSULE ORAL DAILY
Status: DISCONTINUED | OUTPATIENT
Start: 2019-07-19 | End: 2019-07-20 | Stop reason: HOSPADM

## 2019-07-18 RX ORDER — ATENOLOL 25 MG/1
25 TABLET ORAL DAILY
Status: DISCONTINUED | OUTPATIENT
Start: 2019-07-19 | End: 2019-07-20

## 2019-07-18 RX ORDER — ACETAMINOPHEN 325 MG/1
650 TABLET ORAL EVERY 4 HOURS PRN
Status: DISCONTINUED | OUTPATIENT
Start: 2019-07-18 | End: 2019-07-19 | Stop reason: SDUPTHER

## 2019-07-18 RX ORDER — CITALOPRAM 20 MG/1
40 TABLET ORAL DAILY
Status: DISCONTINUED | OUTPATIENT
Start: 2019-07-19 | End: 2019-07-20 | Stop reason: HOSPADM

## 2019-07-18 RX ORDER — DULOXETIN HYDROCHLORIDE 20 MG/1
20 CAPSULE, DELAYED RELEASE ORAL DAILY
Status: DISCONTINUED | OUTPATIENT
Start: 2019-07-19 | End: 2019-07-20 | Stop reason: HOSPADM

## 2019-07-18 RX ORDER — POTASSIUM CHLORIDE 7.45 MG/ML
10 INJECTION INTRAVENOUS PRN
Status: DISCONTINUED | OUTPATIENT
Start: 2019-07-18 | End: 2019-07-20 | Stop reason: HOSPADM

## 2019-07-18 RX ORDER — POTASSIUM CHLORIDE 20 MEQ/1
20 TABLET, EXTENDED RELEASE ORAL ONCE
Status: DISCONTINUED | OUTPATIENT
Start: 2019-07-18 | End: 2019-07-18

## 2019-07-18 RX ORDER — SODIUM CHLORIDE 9 MG/ML
INJECTION, SOLUTION INTRAVENOUS CONTINUOUS
Status: DISCONTINUED | OUTPATIENT
Start: 2019-07-18 | End: 2019-07-20 | Stop reason: HOSPADM

## 2019-07-18 RX ORDER — CIPROFLOXACIN 500 MG/1
500 TABLET, FILM COATED ORAL 2 TIMES DAILY
Status: DISCONTINUED | OUTPATIENT
Start: 2019-07-18 | End: 2019-07-20 | Stop reason: HOSPADM

## 2019-07-18 RX ORDER — POTASSIUM CHLORIDE 7.45 MG/ML
10 INJECTION INTRAVENOUS
Status: ACTIVE | OUTPATIENT
Start: 2019-07-18 | End: 2019-07-18

## 2019-07-18 RX ADMIN — CIPROFLOXACIN 500 MG: 500 TABLET ORAL at 21:18

## 2019-07-18 RX ADMIN — POTASSIUM CHLORIDE 40 MEQ: 20 TABLET, EXTENDED RELEASE ORAL at 22:43

## 2019-07-18 RX ADMIN — POTASSIUM CHLORIDE 10 MEQ: 7.45 INJECTION INTRAVENOUS at 14:00

## 2019-07-18 RX ADMIN — WARFARIN SODIUM 2 MG: 2 TABLET ORAL at 21:40

## 2019-07-18 RX ADMIN — SODIUM CHLORIDE: 9 INJECTION, SOLUTION INTRAVENOUS at 12:20

## 2019-07-18 RX ADMIN — BUSPIRONE HYDROCHLORIDE 15 MG: 15 TABLET ORAL at 21:40

## 2019-07-18 NOTE — BRIEF OP NOTE
Brief Postoperative Note  ______________________________________________________________    Patient: Justa Lei  YOB: 1940  MRN: [id number]  Date of Procedure:     Pre-Op Diagnosis: AFlutter     Post-Op Diagnosis: Same       Procedure: AFlutter ablation     Anesthesia: conscious     Estimated Blood Loss: 20 CC     Complications: None     Procedure summary:  The patient underwent AFlutter ablation. Two venous sheaths were placed on the right femoral vein. A figure 8 suture was placed in the right groin post-procedure.        Plan:  -- Admit to step down for overnight stay  -- Remove figure 8 sutures in 4 hours after sheath were pulled  -- Continue coumadin after suture removal         Yesica Denny MD  Date: 7/18/2019  Time: 5:03 PM

## 2019-07-19 LAB
ANION GAP SERPL CALCULATED.3IONS-SCNC: 11 MMOL/L (ref 9–17)
BUN BLDV-MCNC: 18 MG/DL (ref 8–23)
BUN/CREAT BLD: ABNORMAL (ref 9–20)
CALCIUM SERPL-MCNC: 9 MG/DL (ref 8.6–10.4)
CHLORIDE BLD-SCNC: 103 MMOL/L (ref 98–107)
CO2: 29 MMOL/L (ref 20–31)
CREAT SERPL-MCNC: 1.04 MG/DL (ref 0.5–0.9)
EKG ATRIAL RATE: 68 BPM
EKG P AXIS: 62 DEGREES
EKG P-R INTERVAL: 194 MS
EKG Q-T INTERVAL: 458 MS
EKG QRS DURATION: 146 MS
EKG QTC CALCULATION (BAZETT): 487 MS
EKG R AXIS: -41 DEGREES
EKG T AXIS: -32 DEGREES
EKG VENTRICULAR RATE: 68 BPM
GFR AFRICAN AMERICAN: >60 ML/MIN
GFR NON-AFRICAN AMERICAN: 51 ML/MIN
GFR SERPL CREATININE-BSD FRML MDRD: ABNORMAL ML/MIN/{1.73_M2}
GFR SERPL CREATININE-BSD FRML MDRD: ABNORMAL ML/MIN/{1.73_M2}
GLUCOSE BLD-MCNC: 85 MG/DL (ref 70–99)
POTASSIUM SERPL-SCNC: 4.4 MMOL/L (ref 3.7–5.3)
SODIUM BLD-SCNC: 143 MMOL/L (ref 135–144)

## 2019-07-19 PROCEDURE — 2580000003 HC RX 258: Performed by: INTERNAL MEDICINE

## 2019-07-19 PROCEDURE — 6370000000 HC RX 637 (ALT 250 FOR IP): Performed by: INTERNAL MEDICINE

## 2019-07-19 PROCEDURE — 90792 PSYCH DIAG EVAL W/MED SRVCS: CPT | Performed by: NURSE PRACTITIONER

## 2019-07-19 PROCEDURE — 36415 COLL VENOUS BLD VENIPUNCTURE: CPT

## 2019-07-19 PROCEDURE — 80048 BASIC METABOLIC PNL TOTAL CA: CPT

## 2019-07-19 PROCEDURE — G0378 HOSPITAL OBSERVATION PER HR: HCPCS

## 2019-07-19 RX ORDER — SODIUM CHLORIDE 0.9 % (FLUSH) 0.9 %
10 SYRINGE (ML) INJECTION PRN
Status: DISCONTINUED | OUTPATIENT
Start: 2019-07-19 | End: 2019-07-20 | Stop reason: HOSPADM

## 2019-07-19 RX ORDER — ACETAMINOPHEN 325 MG/1
650 TABLET ORAL EVERY 4 HOURS PRN
Status: DISCONTINUED | OUTPATIENT
Start: 2019-07-19 | End: 2019-07-20 | Stop reason: HOSPADM

## 2019-07-19 RX ORDER — SODIUM CHLORIDE 0.9 % (FLUSH) 0.9 %
10 SYRINGE (ML) INJECTION EVERY 12 HOURS SCHEDULED
Status: DISCONTINUED | OUTPATIENT
Start: 2019-07-19 | End: 2019-07-20 | Stop reason: HOSPADM

## 2019-07-19 RX ADMIN — BUSPIRONE HYDROCHLORIDE 15 MG: 15 TABLET ORAL at 08:25

## 2019-07-19 RX ADMIN — WARFARIN SODIUM 2 MG: 2 TABLET ORAL at 18:02

## 2019-07-19 RX ADMIN — BUSPIRONE HYDROCHLORIDE 15 MG: 15 TABLET ORAL at 20:29

## 2019-07-19 RX ADMIN — Medication 10 ML: at 08:26

## 2019-07-19 RX ADMIN — CIPROFLOXACIN 500 MG: 500 TABLET ORAL at 08:25

## 2019-07-19 RX ADMIN — CITALOPRAM 40 MG: 20 TABLET, FILM COATED ORAL at 08:25

## 2019-07-19 RX ADMIN — ACETAMINOPHEN 650 MG: 325 TABLET ORAL at 14:49

## 2019-07-19 RX ADMIN — DULOXETINE 20 MG: 20 CAPSULE, DELAYED RELEASE ORAL at 08:26

## 2019-07-19 RX ADMIN — Medication 10 ML: at 20:29

## 2019-07-19 RX ADMIN — DICYCLOMINE HYDROCHLORIDE 20 MG: 10 CAPSULE ORAL at 08:25

## 2019-07-19 RX ADMIN — ATENOLOL 25 MG: 25 TABLET ORAL at 08:25

## 2019-07-19 RX ADMIN — Medication 1000 MCG: at 08:26

## 2019-07-19 RX ADMIN — CIPROFLOXACIN 500 MG: 500 TABLET ORAL at 20:28

## 2019-07-19 RX ADMIN — ALLOPURINOL 300 MG: 300 TABLET ORAL at 08:25

## 2019-07-19 RX ADMIN — VITAMIN D, TAB 1000IU (100/BT) 1000 UNITS: 25 TAB at 08:25

## 2019-07-19 ASSESSMENT — PAIN SCALES - GENERAL: PAINLEVEL_OUTOF10: 4

## 2019-07-19 NOTE — CONSULTS
Past Medical History:        Diagnosis Date    Anticoagulated on Coumadin     Atrial flutter (HCC)     Bladder disorder     Neurogenic    Cancer (HCC)     basal cell to lt. side of nose.  Cellulitis of leg     JACQUELYN. LEGS MORE TO LEFT.  Depression     DJD (degenerative joint disease)     DVT (deep venous thrombosis) ([address]     after PICC INSERTION TO RT. AXILLA.  Dyslipidemia     Fibromyalgia     Full dentures     FULL ON TOP ,PARTIAL ON BOTTOM.  LEOPOLDO (generalized anxiety disorder)     Gout     Headache(784.0)     migraine    Ione (hard of hearing)     Hx of blood clots     Hypertension     Infection     PT. STATES \" I'M REAL SUSCEPTIBLE TO INFECTION. \"    Lymphedema     Obesity     Psoriasis     Renal insufficiency     Thrush     h/o    Urinary incontinence     stress    UTI (urinary tract infection)     HX. OF.    Vitamin D deficiency     Wears glasses        Past Surgical History:        Procedure Laterality Date    ABDOMEN SURGERY      ABLATION OF DYSRHYTHMIC FOCUS  2019    ATRIAL FLUTTER    CARDIOVERSION  2015    CATARACT REMOVAL Bilateral     IOL'S    CATARACT REMOVAL WITH IMPLANT Bilateral      SECTION      X3    CHOLECYSTECTOMY  10/8/15    lap [place]    ERCP  10/10/15    w/photos; sphincterotomy; placement of 10Fr - 9mm CBD stent    EYE SURGERY      FRACTURE SURGERY Left     FOOT    JOINT REPLACEMENT      biltat TKA    KNEE FUSION      left WITH ROLF IN PLACE.  REMOVAL FOREIGN BODY EAR  2019    infection    SKIN BIOPSY      LT. SIDE OF NOSE.  TRANSESOPHAGEAL ECHOCARDIOGRAM  2015    UPPER GASTROINTESTINAL ENDOSCOPY  3/30/2016    stent removal       Allergies: Morphine; Pcn [penicillins]; Sulfa antibiotics; and Ceftriaxone      Social History:  Patient states she graduated from the [employer] and was a schoolteacher for many years. Her  was also a schoolteacher.   She states her  has had health problems following E78.5    CKD (chronic kidney disease) N18.9    Depression F32.9    Anxiety F41.9    Gout M10.9    Chronic calculous cholecystitis K80.10    Morbid obesity with BMI of 50.0-59.9, adult ([facility]) E66.01, Z68.43    CKD (chronic kidney disease) stage 3, GFR 30-59 ml/min ([facility]) N18.3    Benign essential HTN I10    Hypokalemia E87.6    Status post laparoscopic cholecystectomy Z90.49    Bile leak K83.9    Chronic cholecystitis with calculus K80.10    Stones common duct K80.50    Idiopathic gout M10.00    Lymphedema of both lower extremities I89.0    Venous stasis dermatitis of both lower extremities I87.2    Essential hypertension I10    Cardiac arrhythmia I49.9    Atrial flutter ([facility]) I48.92    Recurrent major depressive disorder, in partial remission ([facility]) F33.41    Mastoiditis of right side H70.91    Cellulitis of face L03. 211    Acute parotitis K11.21    Atypical atrial flutter ([facility]) I48.4       PLAN:  Patient is currently prescribed 2 SSRI anti-depressant medications, and I recommended to both  patient and her son that she discuss tapering off Celexa with her PCP. Recommend she continue Cymbalta as this may be assisting with her pain issues. With the single agent, the dose could be increased to 60 mg bid. She reports Wellbutrin was effective in the past, and this would be a more logical choice to add to Cymbalta if necessary, as it affects both serotonin and norepinephrine. Her son took notes and patient stated she will discuss this with her physician at her next appointment. She is currently reporting that her level of depression is 2/10. The  made a referral to Joshua Rowe regarding the situation at home with patient's son. This appears to be a significant contributing factor to her intermittent suicidal thoughts, and her mood may be moving with the information that [facility] will investigate.  I also recommended that patient consider having a psychiatrist manage her patients, however she

## 2019-07-20 VITALS
SYSTOLIC BLOOD PRESSURE: 109 MMHG | OXYGEN SATURATION: 94 % | BODY MASS INDEX: 50.02 KG/M2 | HEIGHT: 64 IN | RESPIRATION RATE: 18 BRPM | TEMPERATURE: 97.9 F | DIASTOLIC BLOOD PRESSURE: 30 MMHG | WEIGHT: 293 LBS | HEART RATE: 74 BPM

## 2019-07-20 LAB
-: NORMAL
ANION GAP SERPL CALCULATED.3IONS-SCNC: 9 MMOL/L (ref 9–17)
BUN BLDV-MCNC: 24 MG/DL (ref 8–23)
BUN/CREAT BLD: ABNORMAL (ref 9–20)
CALCIUM SERPL-MCNC: 8.8 MG/DL (ref 8.6–10.4)
CHLORIDE BLD-SCNC: 101 MMOL/L (ref 98–107)
CO2: 27 MMOL/L (ref 20–31)
CREAT SERPL-MCNC: 1.08 MG/DL (ref 0.5–0.9)
GFR AFRICAN AMERICAN: 59 ML/MIN
GFR NON-AFRICAN AMERICAN: 49 ML/MIN
GFR SERPL CREATININE-BSD FRML MDRD: ABNORMAL ML/MIN/{1.73_M2}
GFR SERPL CREATININE-BSD FRML MDRD: ABNORMAL ML/MIN/{1.73_M2}
GLUCOSE BLD-MCNC: 97 MG/DL (ref 70–99)
INR BLD: 2
POTASSIUM SERPL-SCNC: 3.8 MMOL/L (ref 3.7–5.3)
PROTHROMBIN TIME: 20.1 SEC (ref 9–12)
REASON FOR REJECTION: NORMAL
SODIUM BLD-SCNC: 137 MMOL/L (ref 135–144)
ZZ NTE CLEAN UP: ORDERED TEST: NORMAL
ZZ NTE WITH NAME CLEAN UP: SPECIMEN SOURCE: NORMAL

## 2019-07-20 PROCEDURE — 36415 COLL VENOUS BLD VENIPUNCTURE: CPT

## 2019-07-20 PROCEDURE — 6370000000 HC RX 637 (ALT 250 FOR IP): Performed by: INTERNAL MEDICINE

## 2019-07-20 PROCEDURE — G0378 HOSPITAL OBSERVATION PER HR: HCPCS

## 2019-07-20 PROCEDURE — 80048 BASIC METABOLIC PNL TOTAL CA: CPT

## 2019-07-20 PROCEDURE — 85610 PROTHROMBIN TIME: CPT

## 2019-07-20 PROCEDURE — 2580000003 HC RX 258: Performed by: INTERNAL MEDICINE

## 2019-07-20 RX ORDER — ATENOLOL 50 MG/1
50 TABLET ORAL DAILY
Qty: 30 TABLET | Refills: 1 | Status: SHIPPED | OUTPATIENT
Start: 2019-07-20 | End: 2020-06-16 | Stop reason: DRUGHIGH

## 2019-07-20 RX ORDER — ATENOLOL 50 MG/1
50 TABLET ORAL DAILY
Status: DISCONTINUED | OUTPATIENT
Start: 2019-07-21 | End: 2019-07-20 | Stop reason: HOSPADM

## 2019-07-20 RX ADMIN — DICYCLOMINE HYDROCHLORIDE 20 MG: 10 CAPSULE ORAL at 08:20

## 2019-07-20 RX ADMIN — FUROSEMIDE 80 MG: 40 TABLET ORAL at 08:19

## 2019-07-20 RX ADMIN — ATENOLOL 25 MG: 25 TABLET ORAL at 08:19

## 2019-07-20 RX ADMIN — CITALOPRAM 40 MG: 20 TABLET, FILM COATED ORAL at 08:19

## 2019-07-20 RX ADMIN — ACETAMINOPHEN 650 MG: 325 TABLET ORAL at 05:51

## 2019-07-20 RX ADMIN — VITAMIN D, TAB 1000IU (100/BT) 1000 UNITS: 25 TAB at 08:20

## 2019-07-20 RX ADMIN — DULOXETINE 20 MG: 20 CAPSULE, DELAYED RELEASE ORAL at 08:19

## 2019-07-20 RX ADMIN — CIPROFLOXACIN 500 MG: 500 TABLET ORAL at 08:19

## 2019-07-20 RX ADMIN — BUSPIRONE HYDROCHLORIDE 15 MG: 15 TABLET ORAL at 08:19

## 2019-07-20 RX ADMIN — Medication 1000 MCG: at 08:21

## 2019-07-20 RX ADMIN — ALLOPURINOL 300 MG: 300 TABLET ORAL at 08:19

## 2019-07-20 RX ADMIN — Medication 10 ML: at 08:20

## 2019-07-20 ASSESSMENT — PAIN SCALES - GENERAL
PAINLEVEL_OUTOF10: 0
PAINLEVEL_OUTOF10: 8
PAINLEVEL_OUTOF10: 0

## 2019-07-20 NOTE — DISCHARGE INSTR - DIET

## 2019-07-20 NOTE — PROGRESS NOTES
Occupational Therapy Not Seen Note    DATE: 2019  Name: Jhonny Roche  : 1940  MRN: [id number]    Patient not available for Occupational Therapy due to: Other: Cx per RN, pt to discharge to home.   discharge report in chart     Next Scheduled Treatment: Discharging from hospital    Electronically signed by JENNIFER Ospina on 2019 at 3:01 PM

## 2019-07-20 NOTE — PROGRESS NOTES
[facility]   Progress Note                    Date:   7/20/2019  Patient name:  Concha Roberts  Date of admission:  7/18/2019  6:20 PM  MRN:   [id number]  YOB: 1940  PCP:    Shea Arora MD    Reason for Admission:  Atrial flutter [facility]) [I48.92]  Atypical atrial flutter ([address] [I48.4]    Subjective:       Clinical Changes / Abnormalities: The patient underwent AFlutter ablation. Two venous sheaths were placed on the right femoral vein. A figure 8 suture was placed in the right groin post-procedure. The patient was seen and examined . I/O last 3 completed shifts: In: 300 [P.O.:300]  Out: 420 [Urine:420]  No intake/output data recorded. In: 300 [P.O.:300]  Out: 420 [Urine:420]      Intake/Output Summary (Last 24 hours) at 7/20/2019 0841  Last data filed at 7/20/2019 0545  Gross per 24 hour   Intake 300 ml   Output 420 ml   Net -120 ml         I/O since admission:  liters    Medications:   Scheduled Meds:   sodium chloride flush  10 mL Intravenous 2 times per day    vitamin B-12  1,000 mcg Oral Daily    vitamin D  1,000 Units Oral Daily    dicyclomine  20 mg Oral Daily    furosemide  80 mg Oral Daily    ciprofloxacin  500 mg Oral BID    allopurinol  300 mg Oral Daily    atenolol  25 mg Oral Daily    warfarin  2 mg Oral Daily    DULoxetine  20 mg Oral Daily    busPIRone  15 mg Oral BID    citalopram  40 mg Oral Daily     Continuous Infusions:   sodium chloride 50 mL/hr at 07/18/19 1220     CBC: No results for input(s): WBC, HGB, PLT in the last 72 hours. BMP:    Recent Labs     07/18/19  1212 07/19/19  0639 07/20/19  0522   NA  --  143 137   K  --  4.4 3.8   CL  --  103 101   CO2  --  29 27   BUN  --  18 24*   CREATININE 1.20* 1.04* 1.08*   GLUCOSE  --  85 97     Hepatic: No results for input(s): AST, ALT, ALB, BILITOT, ALKPHOS in the last 72 hours. Troponin:No results for input(s): TROPHS in the last 72 hours.          No results for input(s): TROPONINI in the last 72 hours. No results for input(s): TROPONINT in the last 72 hours. BNP: No results for input(s): PROBNP in the last 72 hours. No results for input(s): BNP in the last 72 hours. Lipids: No results for input(s): CHOL, HDL in the last 72 hours. Invalid input(s): LDLCALCU  INR:   Recent Labs     19  1253   INR 2.6       Objective:   Vitals: /66   Pulse 71   Temp 97.7 °F (36.5 °C) (Oral)   Resp 18   Ht 5' 4\" (1.626 m)   Wt (!) 306 lb 6.4 oz (139 kg)   SpO2 96%   BMI 52.59 kg/m²      General appearance: awake, alert, in no apparent distress. HEENT: Head: Normocephalic, atraumatic without any obvious abnormalities. Eyes: pupils equal and reactive, extraocular eye movements intact. mucous membranes moist, pharynx normal without lesions  Cardiovascular:  regular rate and rhythm, S1, S2 normal, no S3 or S4, no click and no rub  Respiratory:  clear to auscultation bilaterally, no wheezes, no rales and no rhonchi  Gastrointestinal:  soft, non-tender; bowel sounds normal; no masses,  no organomegaly  Extremities: No calf assymetry. Lower extremity edema . Peripheral pulses in b/l LE present  Musculoskeletal:  no joint tenderness, deformity or swelling, no muscular tenderness noted  Skin:  warm and dry and anicteric  Neurologic:  alert, oriented, normal speech, no focal findings or movement disorder noted  Psychiatric:  appropriate    Diagnostic Studies:     EK/18 Normal sinus rhythm  Left axis deviation  Right bundle branch block  Minimal voltage criteria for LVH, may be normal variant  T wave abnormality, consider lateral ischemia    Patient's Active Problem List   Active Problems:    Atypical atrial flutter (HCC)    Atrial flutter (HCC)  Resolved Problems:    * No resolved hospital problems. *        Assessment / Acute Cardiac Problems:   1. S/p atrial flutter ablation   2. Cardiac risk factors include HTN, HLD, Obesity        Plan of Treatment:   1.  Increase dose of

## 2019-07-20 NOTE — CARE COORDINATION
Spoke with patient and spouse, they are declining home care at this time    Discharge [address] Case Management Department  Written by: Maurizio Hui RN    Patient Name: Anderson Carreon  Attending Provider: James Loredo MD  Admit Date: 2019  6:20 PM  MRN: [id number]  Account: [de-identified]                     : 1940  Discharge Date:  2019        Disposition: home    Maurizio Hui RN

## 2019-07-24 NOTE — H&P
mouth 4 times daily 12/20/18   Sean Casper MD        No current facility-administered medications for this encounter. Allergies:  Morphine; Pcn [penicillins]; Sulfa antibiotics; and Ceftriaxone    Social History:   reports that she has never smoked. She has never used smokeless tobacco. She reports that she does not drink alcohol or use drugs. Family History: family history includes Heart Disease in her father and another family member; High Blood Pressure in her father and another family member; Stroke in her father and another family member. REVIEW OF SYSTEMS:      · Constitutional: there has been no unanticipated weight loss. · Eyes: No visual changes or diplopia. · ENT: No Headaches  · Cardiovascular:  Remaining as above  · Respiratory: No previous pulmonary problems, No cough  · Gastrointestinal: No abdominal pain. No change in bowel or bladder habits. · Genitourinary: No dysuria, trouble voiding, or hematuria. · Neurological: No headache. PHYSICAL EXAM:      BP (!) 109/30   Pulse 74   Temp 97.9 °F (36.6 °C) (Oral)   Resp 18   Ht 5' 4\" (1.626 m)   Wt (!) 306 lb 6.4 oz (139 kg)   SpO2 94%   BMI 52.59 kg/m²    No intake or output data in the 24 hours ending 07/24/19 1337        Constitutional and General Appearance:   alert, cooperative, no distress and appears stated age  HEENT:  · PERRL, EOMI  Respiratory:  · Normal excursion and expansion without use of accessory muscles  · Resp Auscultation:  good respiratory effort. No for increased work of breathing. On auscultation: clear to auscultation bilaterally  Cardiovascular:  · Regular rate and rhythm  · S1/S2  · No murmurs\  · The apical impulse is not displaced  Abdomen:  · Soft  · Bowel sounds present  · Non-tender to palpation  Extremities:  · No cyanosis or clubbing  · Lower extremity edema: Yes  Skin:  · Warm and dry  Neurological:  · Alert and oriented.   · Moves all extremities well        DATA:      Labs:     CBC: No

## 2019-08-10 ENCOUNTER — HOSPITAL ENCOUNTER (OUTPATIENT)
Age: 79
Setting detail: SPECIMEN
Discharge: HOME | End: 2019-08-10
Payer: MEDICARE

## 2019-08-10 LAB
INR BLD: 2.1
PROTHROMBIN TIME: 21.2 SEC (ref 9–12)

## 2019-09-10 ENCOUNTER — HOSPITAL ENCOUNTER (OUTPATIENT)
Age: 79
Setting detail: SPECIMEN
Discharge: HOME | End: 2019-09-10
Payer: MEDICARE

## 2019-09-10 LAB
INR BLD: 1.7
PROTHROMBIN TIME: 17.7 SEC (ref 9–12)

## 2019-09-17 ENCOUNTER — HOSPITAL ENCOUNTER (OUTPATIENT)
Age: 79
Setting detail: SPECIMEN
Discharge: HOME | End: 2019-09-17
Payer: MEDICARE

## 2019-09-17 LAB
INR BLD: 2.2
PROTHROMBIN TIME: 22.1 SEC (ref 9–12)

## 2019-10-02 ENCOUNTER — HOSPITAL ENCOUNTER (OUTPATIENT)
Age: 79
Setting detail: SPECIMEN
Discharge: HOME | End: 2019-10-02
Payer: MEDICARE

## 2019-10-02 LAB
INR BLD: 1.8
PROTHROMBIN TIME: 18.3 SEC (ref 9–12)

## 2019-10-15 ENCOUNTER — HOSPITAL ENCOUNTER (OUTPATIENT)
Age: 79
Setting detail: SPECIMEN
Discharge: HOME | End: 2019-10-15
Payer: MEDICARE

## 2019-10-15 LAB
INR BLD: 2.8
PROTHROMBIN TIME: 26.9 SEC (ref 9–12)

## 2019-10-29 ENCOUNTER — HOSPITAL ENCOUNTER (OUTPATIENT)
Age: 79
Setting detail: SPECIMEN
Discharge: HOME | End: 2019-10-29
Payer: MEDICARE

## 2019-10-29 LAB
INR BLD: 4.9
PROTHROMBIN TIME: 45.5 SEC (ref 9–12)

## 2019-11-04 ENCOUNTER — HOSPITAL ENCOUNTER (EMERGENCY)
Age: 79
Discharge: HOME | End: 2019-11-04
Attending: EMERGENCY MEDICINE
Payer: MEDICARE

## 2019-11-04 ENCOUNTER — APPOINTMENT (OUTPATIENT)
Dept: ULTRASOUND IMAGING | Age: 79
End: 2019-11-04
Payer: MEDICARE

## 2019-11-04 VITALS
BODY MASS INDEX: 50.02 KG/M2 | DIASTOLIC BLOOD PRESSURE: 76 MMHG | HEIGHT: 64 IN | OXYGEN SATURATION: 96 % | TEMPERATURE: 97.9 F | SYSTOLIC BLOOD PRESSURE: 109 MMHG | WEIGHT: 293 LBS | HEART RATE: 65 BPM | RESPIRATION RATE: 16 BRPM

## 2019-11-04 DIAGNOSIS — N39.0 URINARY TRACT INFECTION WITH HEMATURIA, SITE UNSPECIFIED: Primary | ICD-10-CM

## 2019-11-04 DIAGNOSIS — R31.9 URINARY TRACT INFECTION WITH HEMATURIA, SITE UNSPECIFIED: Primary | ICD-10-CM

## 2019-11-04 LAB
-: ABNORMAL
-: NORMAL
ABSOLUTE EOS #: 0.2 K/UL (ref 0–0.4)
ABSOLUTE IMMATURE GRANULOCYTE: ABNORMAL K/UL (ref 0–0.3)
ABSOLUTE LYMPH #: 1.1 K/UL (ref 1–4.8)
ABSOLUTE MONO #: 0.6 K/UL (ref 0.1–1.3)
AMORPHOUS: ABNORMAL
ANION GAP SERPL CALCULATED.3IONS-SCNC: 12 MMOL/L (ref 9–17)
BACTERIA: ABNORMAL
BASOPHILS # BLD: 1 % (ref 0–2)
BASOPHILS ABSOLUTE: 0.1 K/UL (ref 0–0.2)
BILIRUBIN URINE: ABNORMAL
BUN BLDV-MCNC: 14 MG/DL (ref 8–23)
BUN/CREAT BLD: ABNORMAL (ref 9–20)
CALCIUM SERPL-MCNC: 9.6 MG/DL (ref 8.6–10.4)
CASTS UA: ABNORMAL /LPF
CHLORIDE BLD-SCNC: 101 MMOL/L (ref 98–107)
CO2: 29 MMOL/L (ref 20–31)
COLOR: ABNORMAL
COMMENT UA: ABNORMAL
CREAT SERPL-MCNC: 1.16 MG/DL (ref 0.5–0.9)
CRYSTALS, UA: ABNORMAL /HPF
DIFFERENTIAL TYPE: ABNORMAL
DIRECT EXAM: NORMAL
EOSINOPHILS RELATIVE PERCENT: 2 % (ref 0–4)
EPITHELIAL CELLS UA: ABNORMAL /HPF
GFR AFRICAN AMERICAN: 55 ML/MIN
GFR NON-AFRICAN AMERICAN: 45 ML/MIN
GFR SERPL CREATININE-BSD FRML MDRD: ABNORMAL ML/MIN/{1.73_M2}
GFR SERPL CREATININE-BSD FRML MDRD: ABNORMAL ML/MIN/{1.73_M2}
GLUCOSE BLD-MCNC: 113 MG/DL (ref 70–99)
GLUCOSE URINE: NEGATIVE
HCT VFR BLD CALC: 40.7 % (ref 36–46)
HEMOGLOBIN: 13.1 G/DL (ref 12–16)
IMMATURE GRANULOCYTES: ABNORMAL %
KETONES, URINE: ABNORMAL
LEUKOCYTE ESTERASE, URINE: ABNORMAL
LYMPHOCYTES # BLD: 14 % (ref 24–44)
Lab: NORMAL
MAGNESIUM: 2 MG/DL (ref 1.6–2.6)
MCH RBC QN AUTO: 31.2 PG (ref 26–34)
MCHC RBC AUTO-ENTMCNC: 32.2 G/DL (ref 31–37)
MCV RBC AUTO: 96.9 FL (ref 80–100)
MONOCYTES # BLD: 7 % (ref 1–7)
MUCUS: ABNORMAL
NITRITE, URINE: POSITIVE
NRBC AUTOMATED: ABNORMAL PER 100 WBC
OTHER OBSERVATIONS UA: ABNORMAL
PDW BLD-RTO: 15.5 % (ref 11.5–14.9)
PH UA: 6.5 (ref 5–8)
PLATELET # BLD: 203 K/UL (ref 150–450)
PLATELET ESTIMATE: ABNORMAL
PMV BLD AUTO: 9.6 FL (ref 6–12)
POTASSIUM SERPL-SCNC: 3.2 MMOL/L (ref 3.7–5.3)
PROTEIN UA: ABNORMAL
RBC # BLD: 4.2 M/UL (ref 4–5.2)
RBC # BLD: ABNORMAL 10*6/UL
RBC UA: ABNORMAL /HPF
REASON FOR REJECTION: NORMAL
RENAL EPITHELIAL, UA: ABNORMAL /HPF
SEG NEUTROPHILS: 76 % (ref 36–66)
SEGMENTED NEUTROPHILS ABSOLUTE COUNT: 6.4 K/UL (ref 1.3–9.1)
SODIUM BLD-SCNC: 142 MMOL/L (ref 135–144)
SPECIFIC GRAVITY UA: 1.02 (ref 1–1.03)
SPECIMEN DESCRIPTION: NORMAL
TRICHOMONAS: ABNORMAL
TURBIDITY: ABNORMAL
URINE HGB: ABNORMAL
UROBILINOGEN, URINE: NORMAL
WBC # BLD: 8.5 K/UL (ref 3.5–11)
WBC # BLD: ABNORMAL 10*3/UL
WBC UA: ABNORMAL /HPF
YEAST: ABNORMAL
ZZ NTE CLEAN UP: ORDERED TEST: NORMAL
ZZ NTE WITH NAME CLEAN UP: SPECIMEN SOURCE: NORMAL

## 2019-11-04 PROCEDURE — 85025 COMPLETE CBC W/AUTO DIFF WBC: CPT

## 2019-11-04 PROCEDURE — 2580000003 HC RX 258: Performed by: EMERGENCY MEDICINE

## 2019-11-04 PROCEDURE — 96365 THER/PROPH/DIAG IV INF INIT: CPT

## 2019-11-04 PROCEDURE — 87660 TRICHOMONAS VAGIN DIR PROBE: CPT

## 2019-11-04 PROCEDURE — 6360000002 HC RX W HCPCS: Performed by: EMERGENCY MEDICINE

## 2019-11-04 PROCEDURE — 80048 BASIC METABOLIC PNL TOTAL CA: CPT

## 2019-11-04 PROCEDURE — 99283 EMERGENCY DEPT VISIT LOW MDM: CPT

## 2019-11-04 PROCEDURE — 81001 URINALYSIS AUTO W/SCOPE: CPT

## 2019-11-04 PROCEDURE — 87186 SC STD MICRODIL/AGAR DIL: CPT

## 2019-11-04 PROCEDURE — 87086 URINE CULTURE/COLONY COUNT: CPT

## 2019-11-04 PROCEDURE — 87510 GARDNER VAG DNA DIR PROBE: CPT

## 2019-11-04 PROCEDURE — 87480 CANDIDA DNA DIR PROBE: CPT

## 2019-11-04 PROCEDURE — 83735 ASSAY OF MAGNESIUM: CPT

## 2019-11-04 PROCEDURE — 36415 COLL VENOUS BLD VENIPUNCTURE: CPT

## 2019-11-04 PROCEDURE — 76830 TRANSVAGINAL US NON-OB: CPT

## 2019-11-04 PROCEDURE — 87077 CULTURE AEROBIC IDENTIFY: CPT

## 2019-11-04 RX ORDER — CEPHALEXIN 500 MG/1
500 CAPSULE ORAL 2 TIMES DAILY
Qty: 14 CAPSULE | Refills: 0 | Status: SHIPPED | OUTPATIENT
Start: 2019-11-04 | End: 2019-11-11

## 2019-11-04 RX ADMIN — CEFTRIAXONE SODIUM 1 G: 1 INJECTION, POWDER, FOR SOLUTION INTRAMUSCULAR; INTRAVENOUS at 15:00

## 2019-11-04 ASSESSMENT — ENCOUNTER SYMPTOMS
SORE THROAT: 0
BACK PAIN: 0
COUGH: 0
COLOR CHANGE: 0
ABDOMINAL PAIN: 0
SHORTNESS OF BREATH: 0
RHINORRHEA: 0
PHOTOPHOBIA: 0
EYE REDNESS: 0
NAUSEA: 0

## 2019-11-07 ENCOUNTER — HOSPITAL ENCOUNTER (OUTPATIENT)
Age: 79
Setting detail: SPECIMEN
Discharge: HOME | End: 2019-11-07
Payer: MEDICARE

## 2019-11-07 LAB
CULTURE: ABNORMAL
INR BLD: 1.3
Lab: ABNORMAL
PROTHROMBIN TIME: 13.7 SEC (ref 9–12)
SPECIMEN DESCRIPTION: ABNORMAL

## 2019-11-14 ENCOUNTER — HOSPITAL ENCOUNTER (OUTPATIENT)
Age: 79
Setting detail: SPECIMEN
Discharge: HOME | End: 2019-11-14
Payer: MEDICARE

## 2019-11-14 LAB
INR BLD: 1.3
PROTHROMBIN TIME: 13.8 SEC (ref 9–12)

## 2019-11-21 ENCOUNTER — HOSPITAL ENCOUNTER (OUTPATIENT)
Age: 79
Setting detail: SPECIMEN
Discharge: HOME | End: 2019-11-21
Payer: MEDICARE

## 2019-11-21 LAB
INR BLD: 3.1
PROTHROMBIN TIME: 30.3 SEC (ref 9–12)

## 2019-11-24 ENCOUNTER — HOSPITAL ENCOUNTER (EMERGENCY)
Age: 79
Discharge: HOME | End: 2019-11-25
Attending: EMERGENCY MEDICINE
Payer: MEDICARE

## 2019-11-24 ENCOUNTER — APPOINTMENT (OUTPATIENT)
Dept: CT IMAGING | Age: 79
End: 2019-11-24
Payer: MEDICARE

## 2019-11-24 VITALS
OXYGEN SATURATION: 98 % | WEIGHT: 293 LBS | SYSTOLIC BLOOD PRESSURE: 109 MMHG | TEMPERATURE: 97.7 F | HEIGHT: 63 IN | BODY MASS INDEX: 51.91 KG/M2 | DIASTOLIC BLOOD PRESSURE: 80 MMHG | RESPIRATION RATE: 16 BRPM | HEART RATE: 68 BPM

## 2019-11-24 DIAGNOSIS — R31.9 HEMATURIA, UNSPECIFIED TYPE: Primary | ICD-10-CM

## 2019-11-24 DIAGNOSIS — N20.0 KIDNEY STONE: ICD-10-CM

## 2019-11-24 DIAGNOSIS — N30.01 ACUTE CYSTITIS WITH HEMATURIA: ICD-10-CM

## 2019-11-24 LAB
-: ABNORMAL
ABSOLUTE EOS #: 0.3 K/UL (ref 0–0.4)
ABSOLUTE IMMATURE GRANULOCYTE: ABNORMAL K/UL (ref 0–0.3)
ABSOLUTE LYMPH #: 1.6 K/UL (ref 1–4.8)
ABSOLUTE MONO #: 0.7 K/UL (ref 0.1–1.3)
ALBUMIN SERPL-MCNC: 3.6 G/DL (ref 3.5–5.2)
ALBUMIN/GLOBULIN RATIO: ABNORMAL (ref 1–2.5)
ALP BLD-CCNC: 103 U/L (ref 35–104)
ALT SERPL-CCNC: 15 U/L (ref 5–33)
AMORPHOUS: ABNORMAL
ANION GAP SERPL CALCULATED.3IONS-SCNC: 12 MMOL/L (ref 9–17)
AST SERPL-CCNC: 18 U/L
BACTERIA: ABNORMAL
BASOPHILS # BLD: 0 % (ref 0–2)
BASOPHILS ABSOLUTE: 0 K/UL (ref 0–0.2)
BILIRUB SERPL-MCNC: 0.22 MG/DL (ref 0.3–1.2)
BILIRUBIN URINE: NEGATIVE
BUN BLDV-MCNC: 20 MG/DL (ref 8–23)
BUN/CREAT BLD: ABNORMAL (ref 9–20)
CALCIUM SERPL-MCNC: 9.7 MG/DL (ref 8.6–10.4)
CASTS UA: ABNORMAL /LPF
CHLORIDE BLD-SCNC: 102 MMOL/L (ref 98–107)
CO2: 29 MMOL/L (ref 20–31)
COLOR: ABNORMAL
COMMENT UA: ABNORMAL
CREAT SERPL-MCNC: 1.34 MG/DL (ref 0.5–0.9)
CRYSTALS, UA: ABNORMAL /HPF
DIFFERENTIAL TYPE: ABNORMAL
EOSINOPHILS RELATIVE PERCENT: 3 % (ref 0–4)
EPITHELIAL CELLS UA: ABNORMAL /HPF
GFR AFRICAN AMERICAN: 46 ML/MIN
GFR NON-AFRICAN AMERICAN: 38 ML/MIN
GFR SERPL CREATININE-BSD FRML MDRD: ABNORMAL ML/MIN/{1.73_M2}
GFR SERPL CREATININE-BSD FRML MDRD: ABNORMAL ML/MIN/{1.73_M2}
GLUCOSE BLD-MCNC: 132 MG/DL (ref 70–99)
GLUCOSE URINE: NEGATIVE
HCT VFR BLD CALC: 42.1 % (ref 36–46)
HEMOGLOBIN: 13.9 G/DL (ref 12–16)
IMMATURE GRANULOCYTES: ABNORMAL %
INR BLD: 3.1
KETONES, URINE: NEGATIVE
LEUKOCYTE ESTERASE, URINE: NEGATIVE
LYMPHOCYTES # BLD: 17 % (ref 24–44)
MAGNESIUM: 2.2 MG/DL (ref 1.6–2.6)
MCH RBC QN AUTO: 31.7 PG (ref 26–34)
MCHC RBC AUTO-ENTMCNC: 33.1 G/DL (ref 31–37)
MCV RBC AUTO: 95.9 FL (ref 80–100)
MONOCYTES # BLD: 8 % (ref 1–7)
MUCUS: ABNORMAL
NITRITE, URINE: NEGATIVE
NRBC AUTOMATED: ABNORMAL PER 100 WBC
OTHER OBSERVATIONS UA: ABNORMAL
PDW BLD-RTO: 15.2 % (ref 11.5–14.9)
PH UA: 5.5 (ref 5–8)
PLATELET # BLD: 218 K/UL (ref 150–450)
PLATELET ESTIMATE: ABNORMAL
PMV BLD AUTO: 9.6 FL (ref 6–12)
POTASSIUM SERPL-SCNC: 3.5 MMOL/L (ref 3.7–5.3)
PROTEIN UA: ABNORMAL
PROTHROMBIN TIME: 32.1 SEC (ref 11.8–14.6)
RBC # BLD: 4.39 M/UL (ref 4–5.2)
RBC # BLD: ABNORMAL 10*6/UL
RBC UA: ABNORMAL /HPF
RENAL EPITHELIAL, UA: ABNORMAL /HPF
SEG NEUTROPHILS: 72 % (ref 36–66)
SEGMENTED NEUTROPHILS ABSOLUTE COUNT: 6.7 K/UL (ref 1.3–9.1)
SODIUM BLD-SCNC: 143 MMOL/L (ref 135–144)
SPECIFIC GRAVITY UA: 1.02 (ref 1–1.03)
TOTAL PROTEIN: 7.1 G/DL (ref 6.4–8.3)
TRICHOMONAS: ABNORMAL
TURBIDITY: ABNORMAL
URINE HGB: ABNORMAL
UROBILINOGEN, URINE: NORMAL
WBC # BLD: 9.3 K/UL (ref 3.5–11)
WBC # BLD: ABNORMAL 10*3/UL
WBC UA: ABNORMAL /HPF
YEAST: ABNORMAL

## 2019-11-24 PROCEDURE — 81001 URINALYSIS AUTO W/SCOPE: CPT

## 2019-11-24 PROCEDURE — 36415 COLL VENOUS BLD VENIPUNCTURE: CPT

## 2019-11-24 PROCEDURE — 85610 PROTHROMBIN TIME: CPT

## 2019-11-24 PROCEDURE — 74176 CT ABD & PELVIS W/O CONTRAST: CPT

## 2019-11-24 PROCEDURE — 85025 COMPLETE CBC W/AUTO DIFF WBC: CPT

## 2019-11-24 PROCEDURE — 6360000002 HC RX W HCPCS: Performed by: EMERGENCY MEDICINE

## 2019-11-24 PROCEDURE — 96365 THER/PROPH/DIAG IV INF INIT: CPT

## 2019-11-24 PROCEDURE — 83735 ASSAY OF MAGNESIUM: CPT

## 2019-11-24 PROCEDURE — 80053 COMPREHEN METABOLIC PANEL: CPT

## 2019-11-24 PROCEDURE — 99284 EMERGENCY DEPT VISIT MOD MDM: CPT

## 2019-11-24 RX ORDER — CIPROFLOXACIN 2 MG/ML
400 INJECTION, SOLUTION INTRAVENOUS ONCE
Status: COMPLETED | OUTPATIENT
Start: 2019-11-24 | End: 2019-11-24

## 2019-11-24 RX ORDER — NITROFURANTOIN 25; 75 MG/1; MG/1
100 CAPSULE ORAL 2 TIMES DAILY
Qty: 20 CAPSULE | Refills: 0 | Status: SHIPPED | OUTPATIENT
Start: 2019-11-24 | End: 2019-12-03 | Stop reason: ALTCHOICE

## 2019-11-24 RX ADMIN — CIPROFLOXACIN 400 MG: 2 INJECTION, SOLUTION INTRAVENOUS at 22:35

## 2019-11-25 ENCOUNTER — TELEPHONE (OUTPATIENT)
Dept: UROLOGY | Age: 79
End: 2019-11-25

## 2019-11-25 ENCOUNTER — OFFICE VISIT (OUTPATIENT)
Dept: UROLOGY | Age: 79
End: 2019-11-25
Payer: MEDICARE

## 2019-11-25 VITALS
BODY MASS INDEX: 51.91 KG/M2 | SYSTOLIC BLOOD PRESSURE: 124 MMHG | TEMPERATURE: 97.7 F | DIASTOLIC BLOOD PRESSURE: 80 MMHG | WEIGHT: 293 LBS | HEIGHT: 63 IN

## 2019-11-25 DIAGNOSIS — R31.0 GROSS HEMATURIA: Primary | ICD-10-CM

## 2019-11-25 DIAGNOSIS — N20.0 KIDNEY STONE: ICD-10-CM

## 2019-11-25 DIAGNOSIS — N39.46 MIXED INCONTINENCE: ICD-10-CM

## 2019-11-25 PROCEDURE — 99204 OFFICE O/P NEW MOD 45 MIN: CPT | Performed by: UROLOGY

## 2019-11-25 ASSESSMENT — ENCOUNTER SYMPTOMS
EYE REDNESS: 1
CONSTIPATION: 0
SHORTNESS OF BREATH: 0
BACK PAIN: 0
VOMITING: 0
WHEEZING: 1
EYE PAIN: 0
COUGH: 0
NAUSEA: 0
DIARRHEA: 1
ABDOMINAL PAIN: 1

## 2019-12-02 ENCOUNTER — HOSPITAL ENCOUNTER (OUTPATIENT)
Dept: NUCLEAR MEDICINE | Age: 79
Discharge: HOME | End: 2019-12-04
Payer: MEDICARE

## 2019-12-02 DIAGNOSIS — I10 ESSENTIAL (PRIMARY) HYPERTENSION: ICD-10-CM

## 2019-12-02 DIAGNOSIS — Z01.818 PRE-OPERATIVE CLEARANCE: ICD-10-CM

## 2019-12-02 PROCEDURE — 2580000003 HC RX 258: Performed by: INTERNAL MEDICINE

## 2019-12-02 PROCEDURE — 93017 CV STRESS TEST TRACING ONLY: CPT | Performed by: NURSE PRACTITIONER

## 2019-12-02 PROCEDURE — A9500 TC99M SESTAMIBI: HCPCS | Performed by: INTERNAL MEDICINE

## 2019-12-02 PROCEDURE — 78452 HT MUSCLE IMAGE SPECT MULT: CPT

## 2019-12-02 PROCEDURE — 3430000000 HC RX DIAGNOSTIC RADIOPHARMACEUTICAL: Performed by: INTERNAL MEDICINE

## 2019-12-02 RX ADMIN — TETRAKIS(2-METHOXYISOBUTYLISOCYANIDE)COPPER(I) TETRAFLUOROBORATE 37 MILLICURIE: 1 INJECTION, POWDER, LYOPHILIZED, FOR SOLUTION INTRAVENOUS at 14:25

## 2019-12-03 ENCOUNTER — HOSPITAL ENCOUNTER (OUTPATIENT)
Dept: NON INVASIVE DIAGNOSTICS | Age: 79
Discharge: HOME | End: 2019-12-03
Payer: MEDICARE

## 2019-12-03 ENCOUNTER — ANESTHESIA EVENT (OUTPATIENT)
Dept: OPERATING ROOM | Age: 79
End: 2019-12-03
Payer: MEDICARE

## 2019-12-03 LAB
LV EF: 50 %
LV EF: 70 %
LVEF MODALITY: NORMAL
LVEF MODALITY: NORMAL

## 2019-12-03 PROCEDURE — A9500 TC99M SESTAMIBI: HCPCS | Performed by: INTERNAL MEDICINE

## 2019-12-03 PROCEDURE — 6360000002 HC RX W HCPCS: Performed by: INTERNAL MEDICINE

## 2019-12-03 PROCEDURE — 93306 TTE W/DOPPLER COMPLETE: CPT

## 2019-12-03 PROCEDURE — 3430000000 HC RX DIAGNOSTIC RADIOPHARMACEUTICAL: Performed by: INTERNAL MEDICINE

## 2019-12-03 PROCEDURE — 2580000003 HC RX 258: Performed by: INTERNAL MEDICINE

## 2019-12-03 RX ORDER — METOPROLOL TARTRATE 5 MG/5ML
5 INJECTION INTRAVENOUS EVERY 5 MIN PRN
Status: DISCONTINUED | OUTPATIENT
Start: 2019-12-03 | End: 2019-12-03

## 2019-12-03 RX ORDER — ALBUTEROL SULFATE 90 UG/1
2 AEROSOL, METERED RESPIRATORY (INHALATION) PRN
Status: DISCONTINUED | OUTPATIENT
Start: 2019-12-03 | End: 2019-12-03

## 2019-12-03 RX ORDER — SODIUM CHLORIDE 0.9 % (FLUSH) 0.9 %
10 SYRINGE (ML) INJECTION PRN
Status: DISCONTINUED | OUTPATIENT
Start: 2019-12-03 | End: 2019-12-05 | Stop reason: HOSPADM

## 2019-12-03 RX ORDER — AMINOPHYLLINE DIHYDRATE 25 MG/ML
50 INJECTION, SOLUTION INTRAVENOUS PRN
Status: DISCONTINUED | OUTPATIENT
Start: 2019-12-03 | End: 2019-12-03

## 2019-12-03 RX ORDER — ATROPINE SULFATE 0.1 MG/ML
0.5 INJECTION INTRAVENOUS EVERY 5 MIN PRN
Status: DISCONTINUED | OUTPATIENT
Start: 2019-12-03 | End: 2019-12-03

## 2019-12-03 RX ORDER — SODIUM CHLORIDE 9 MG/ML
500 INJECTION, SOLUTION INTRAVENOUS CONTINUOUS PRN
Status: DISCONTINUED | OUTPATIENT
Start: 2019-12-03 | End: 2019-12-03

## 2019-12-03 RX ORDER — WARFARIN SODIUM 2 MG/1
2 TABLET ORAL
COMMUNITY
End: 2021-06-15

## 2019-12-03 RX ORDER — SODIUM CHLORIDE 0.9 % (FLUSH) 0.9 %
10 SYRINGE (ML) INJECTION PRN
Status: DISCONTINUED | OUTPATIENT
Start: 2019-12-03 | End: 2019-12-03

## 2019-12-03 RX ORDER — NITROGLYCERIN 0.4 MG/1
0.4 TABLET SUBLINGUAL EVERY 5 MIN PRN
Status: DISCONTINUED | OUTPATIENT
Start: 2019-12-03 | End: 2019-12-03

## 2019-12-03 RX ADMIN — REGADENOSON 0.4 MG: 0.08 INJECTION, SOLUTION INTRAVENOUS at 10:18

## 2019-12-03 RX ADMIN — TETRAKIS(2-METHOXYISOBUTYLISOCYANIDE)COPPER(I) TETRAFLUOROBORATE 42.6 MILLICURIE: 1 INJECTION, POWDER, LYOPHILIZED, FOR SOLUTION INTRAVENOUS at 10:17

## 2019-12-03 RX ADMIN — Medication 10 ML: at 09:18

## 2019-12-03 RX ADMIN — SODIUM CHLORIDE, PRESERVATIVE FREE 10 ML: 5 INJECTION INTRAVENOUS at 10:17

## 2019-12-04 ENCOUNTER — APPOINTMENT (OUTPATIENT)
Dept: GENERAL RADIOLOGY | Age: 79
End: 2019-12-04
Attending: UROLOGY
Payer: MEDICARE

## 2019-12-04 ENCOUNTER — HOSPITAL ENCOUNTER (OUTPATIENT)
Age: 79
Setting detail: OUTPATIENT SURGERY
Discharge: HOME | End: 2019-12-04
Attending: UROLOGY | Admitting: UROLOGY
Payer: MEDICARE

## 2019-12-04 ENCOUNTER — ANESTHESIA (OUTPATIENT)
Dept: OPERATING ROOM | Age: 79
End: 2019-12-04
Payer: MEDICARE

## 2019-12-04 VITALS
RESPIRATION RATE: 14 BRPM | HEART RATE: 60 BPM | WEIGHT: 293 LBS | BODY MASS INDEX: 51.91 KG/M2 | SYSTOLIC BLOOD PRESSURE: 126 MMHG | OXYGEN SATURATION: 100 % | HEIGHT: 63 IN | TEMPERATURE: 97.3 F | DIASTOLIC BLOOD PRESSURE: 66 MMHG

## 2019-12-04 LAB
POC INR: 5.2
POC POTASSIUM: 3.6 MMOL/L (ref 3.5–4.5)
PROTHROMBIN TIME, POC: 57.2 SEC (ref 10.4–14.2)

## 2019-12-04 PROCEDURE — 2580000003 HC RX 258: Performed by: ANESTHESIOLOGY

## 2019-12-04 PROCEDURE — 84132 ASSAY OF SERUM POTASSIUM: CPT

## 2019-12-04 PROCEDURE — 85610 PROTHROMBIN TIME: CPT

## 2019-12-04 RX ORDER — FENTANYL CITRATE 50 UG/ML
50 INJECTION, SOLUTION INTRAMUSCULAR; INTRAVENOUS EVERY 5 MIN PRN
Status: DISCONTINUED | OUTPATIENT
Start: 2019-12-04 | End: 2019-12-04 | Stop reason: HOSPADM

## 2019-12-04 RX ORDER — CIPROFLOXACIN 2 MG/ML
400 INJECTION, SOLUTION INTRAVENOUS
Status: DISCONTINUED | OUTPATIENT
Start: 2019-12-04 | End: 2019-12-04 | Stop reason: HOSPADM

## 2019-12-04 RX ORDER — LIDOCAINE HYDROCHLORIDE 10 MG/ML
1 INJECTION, SOLUTION EPIDURAL; INFILTRATION; INTRACAUDAL; PERINEURAL
Status: DISCONTINUED | OUTPATIENT
Start: 2019-12-04 | End: 2019-12-04 | Stop reason: HOSPADM

## 2019-12-04 RX ORDER — FENTANYL CITRATE 50 UG/ML
25 INJECTION, SOLUTION INTRAMUSCULAR; INTRAVENOUS EVERY 5 MIN PRN
Status: DISCONTINUED | OUTPATIENT
Start: 2019-12-04 | End: 2019-12-04 | Stop reason: HOSPADM

## 2019-12-04 RX ORDER — SODIUM CHLORIDE, SODIUM LACTATE, POTASSIUM CHLORIDE, CALCIUM CHLORIDE 600; 310; 30; 20 MG/100ML; MG/100ML; MG/100ML; MG/100ML
INJECTION, SOLUTION INTRAVENOUS CONTINUOUS
Status: DISCONTINUED | OUTPATIENT
Start: 2019-12-04 | End: 2019-12-04 | Stop reason: HOSPADM

## 2019-12-04 RX ORDER — HYDROCODONE BITARTRATE AND ACETAMINOPHEN 5; 325 MG/1; MG/1
2 TABLET ORAL PRN
Status: DISCONTINUED | OUTPATIENT
Start: 2019-12-04 | End: 2019-12-04 | Stop reason: HOSPADM

## 2019-12-04 RX ORDER — HYDROCODONE BITARTRATE AND ACETAMINOPHEN 5; 325 MG/1; MG/1
1 TABLET ORAL PRN
Status: DISCONTINUED | OUTPATIENT
Start: 2019-12-04 | End: 2019-12-04 | Stop reason: HOSPADM

## 2019-12-04 RX ADMIN — SODIUM CHLORIDE, POTASSIUM CHLORIDE, SODIUM LACTATE AND CALCIUM CHLORIDE: 600; 310; 30; 20 INJECTION, SOLUTION INTRAVENOUS at 13:35

## 2019-12-04 ASSESSMENT — PAIN - FUNCTIONAL ASSESSMENT: PAIN_FUNCTIONAL_ASSESSMENT: 0-10

## 2019-12-05 ENCOUNTER — HOSPITAL ENCOUNTER (OUTPATIENT)
Age: 79
Setting detail: SPECIMEN
Discharge: HOME | End: 2019-12-05
Payer: MEDICARE

## 2019-12-05 LAB
INR BLD: 4.5
PROTHROMBIN TIME: 42.3 SEC (ref 9–12)

## 2019-12-09 ENCOUNTER — TELEPHONE (OUTPATIENT)
Dept: UROLOGY | Age: 79
End: 2019-12-09

## 2019-12-10 ENCOUNTER — TELEPHONE (OUTPATIENT)
Dept: UROLOGY | Age: 79
End: 2019-12-10

## 2019-12-13 ENCOUNTER — HOSPITAL ENCOUNTER (OUTPATIENT)
Age: 79
Setting detail: SPECIMEN
Discharge: HOME | End: 2019-12-13
Payer: MEDICARE

## 2019-12-13 LAB
INR BLD: 2.8
PROTHROMBIN TIME: 27.1 SEC (ref 9–12)

## 2019-12-27 ENCOUNTER — HOSPITAL ENCOUNTER (OUTPATIENT)
Age: 79
Setting detail: SPECIMEN
Discharge: HOME | End: 2019-12-27
Payer: MEDICARE

## 2019-12-27 LAB
INR BLD: 4.1
PROTHROMBIN TIME: 39 SEC (ref 9–12)

## 2020-01-06 ENCOUNTER — HOSPITAL ENCOUNTER (OUTPATIENT)
Age: 80
Setting detail: SPECIMEN
Discharge: HOME | End: 2020-01-06
Payer: MEDICARE

## 2020-01-06 LAB
INR BLD: 2
PROTHROMBIN TIME: 19.9 SEC (ref 9–12)

## 2020-01-08 ENCOUNTER — HOSPITAL ENCOUNTER (OUTPATIENT)
Age: 80
Setting detail: OUTPATIENT SURGERY
Discharge: HOME | End: 2020-01-08
Attending: UROLOGY | Admitting: UROLOGY
Payer: MEDICARE

## 2020-01-08 ENCOUNTER — ANESTHESIA EVENT (OUTPATIENT)
Dept: OPERATING ROOM | Age: 80
End: 2020-01-08
Payer: MEDICARE

## 2020-01-08 ENCOUNTER — APPOINTMENT (OUTPATIENT)
Dept: GENERAL RADIOLOGY | Age: 80
End: 2020-01-08
Attending: UROLOGY
Payer: MEDICARE

## 2020-01-08 ENCOUNTER — ANESTHESIA (OUTPATIENT)
Dept: OPERATING ROOM | Age: 80
End: 2020-01-08
Payer: MEDICARE

## 2020-01-08 VITALS — DIASTOLIC BLOOD PRESSURE: 104 MMHG | OXYGEN SATURATION: 99 % | SYSTOLIC BLOOD PRESSURE: 135 MMHG | TEMPERATURE: 96.2 F

## 2020-01-08 VITALS
BODY MASS INDEX: 47.09 KG/M2 | HEART RATE: 62 BPM | WEIGHT: 293 LBS | RESPIRATION RATE: 21 BRPM | OXYGEN SATURATION: 94 % | SYSTOLIC BLOOD PRESSURE: 121 MMHG | DIASTOLIC BLOOD PRESSURE: 84 MMHG | HEIGHT: 66 IN | TEMPERATURE: 97.3 F

## 2020-01-08 LAB
GFR NON-AFRICAN AMERICAN: 42 ML/MIN
GFR SERPL CREATININE-BSD FRML MDRD: 51 ML/MIN
GFR SERPL CREATININE-BSD FRML MDRD: ABNORMAL ML/MIN/{1.73_M2}
GLUCOSE BLD-MCNC: 117 MG/DL (ref 74–100)
POC CREATININE: 1.24 MG/DL (ref 0.51–1.19)
POC INR: 2.1
POC POTASSIUM: 3.5 MMOL/L (ref 3.5–4.5)
PROTHROMBIN TIME, POC: 24.9 SEC (ref 10.4–14.2)

## 2020-01-08 PROCEDURE — 82947 ASSAY GLUCOSE BLOOD QUANT: CPT

## 2020-01-08 PROCEDURE — 84132 ASSAY OF SERUM POTASSIUM: CPT

## 2020-01-08 PROCEDURE — 2580000003 HC RX 258: Performed by: ANESTHESIOLOGY

## 2020-01-08 PROCEDURE — C2617 STENT, NON-COR, TEM W/O DEL: HCPCS | Performed by: UROLOGY

## 2020-01-08 PROCEDURE — 6370000000 HC RX 637 (ALT 250 FOR IP): Performed by: ANESTHESIOLOGY

## 2020-01-08 PROCEDURE — 3700000000 HC ANESTHESIA ATTENDED CARE: Performed by: UROLOGY

## 2020-01-08 PROCEDURE — 2720000010 HC SURG SUPPLY STERILE: Performed by: UROLOGY

## 2020-01-08 PROCEDURE — 2709999900 HC NON-CHARGEABLE SUPPLY: Performed by: UROLOGY

## 2020-01-08 PROCEDURE — C1758 CATHETER, URETERAL: HCPCS | Performed by: UROLOGY

## 2020-01-08 PROCEDURE — 82365 CALCULUS SPECTROSCOPY: CPT

## 2020-01-08 PROCEDURE — 2580000003 HC RX 258: Performed by: UROLOGY

## 2020-01-08 PROCEDURE — 7100000011 HC PHASE II RECOVERY - ADDTL 15 MIN: Performed by: UROLOGY

## 2020-01-08 PROCEDURE — 6360000002 HC RX W HCPCS: Performed by: ANESTHESIOLOGY

## 2020-01-08 PROCEDURE — 2500000003 HC RX 250 WO HCPCS: Performed by: NURSE ANESTHETIST, CERTIFIED REGISTERED

## 2020-01-08 PROCEDURE — 7100000000 HC PACU RECOVERY - FIRST 15 MIN: Performed by: UROLOGY

## 2020-01-08 PROCEDURE — 74420 UROGRAPHY RTRGR +-KUB: CPT

## 2020-01-08 PROCEDURE — 7100000010 HC PHASE II RECOVERY - FIRST 15 MIN: Performed by: UROLOGY

## 2020-01-08 PROCEDURE — 85610 PROTHROMBIN TIME: CPT

## 2020-01-08 PROCEDURE — 3600000004 HC SURGERY LEVEL 4 BASE: Performed by: UROLOGY

## 2020-01-08 PROCEDURE — 82565 ASSAY OF CREATININE: CPT

## 2020-01-08 PROCEDURE — 3700000001 HC ADD 15 MINUTES (ANESTHESIA): Performed by: UROLOGY

## 2020-01-08 PROCEDURE — 6360000004 HC RX CONTRAST MEDICATION: Performed by: UROLOGY

## 2020-01-08 PROCEDURE — 3600000014 HC SURGERY LEVEL 4 ADDTL 15MIN: Performed by: UROLOGY

## 2020-01-08 PROCEDURE — 7100000001 HC PACU RECOVERY - ADDTL 15 MIN: Performed by: UROLOGY

## 2020-01-08 PROCEDURE — 6360000002 HC RX W HCPCS: Performed by: NURSE ANESTHETIST, CERTIFIED REGISTERED

## 2020-01-08 PROCEDURE — C1769 GUIDE WIRE: HCPCS | Performed by: UROLOGY

## 2020-01-08 DEVICE — URETERAL STENT
Type: IMPLANTABLE DEVICE | Site: URETER | Status: FUNCTIONAL
Brand: POLARIS™ ULTRA

## 2020-01-08 RX ORDER — CIPROFLOXACIN 2 MG/ML
400 INJECTION, SOLUTION INTRAVENOUS
Status: DISCONTINUED | OUTPATIENT
Start: 2020-01-08 | End: 2020-01-08 | Stop reason: ALTCHOICE

## 2020-01-08 RX ORDER — ROCURONIUM BROMIDE 10 MG/ML
INJECTION, SOLUTION INTRAVENOUS PRN
Status: DISCONTINUED | OUTPATIENT
Start: 2020-01-08 | End: 2020-01-08 | Stop reason: SDUPTHER

## 2020-01-08 RX ORDER — DOCUSATE SODIUM 100 MG/1
100 CAPSULE, LIQUID FILLED ORAL 2 TIMES DAILY
Qty: 60 CAPSULE | Refills: 0 | Status: SHIPPED | OUTPATIENT
Start: 2020-01-08 | End: 2020-02-07

## 2020-01-08 RX ORDER — TAMSULOSIN HYDROCHLORIDE 0.4 MG/1
0.4 CAPSULE ORAL DAILY
Qty: 30 CAPSULE | Refills: 0 | Status: SHIPPED | OUTPATIENT
Start: 2020-01-08 | End: 2021-06-15 | Stop reason: ALTCHOICE

## 2020-01-08 RX ORDER — GENTAMICIN SULFATE 60 MG/50ML
INJECTION, SOLUTION INTRAVENOUS PRN
Status: DISCONTINUED | OUTPATIENT
Start: 2020-01-08 | End: 2020-01-08 | Stop reason: SDUPTHER

## 2020-01-08 RX ORDER — ONDANSETRON 2 MG/ML
INJECTION INTRAMUSCULAR; INTRAVENOUS PRN
Status: DISCONTINUED | OUTPATIENT
Start: 2020-01-08 | End: 2020-01-08 | Stop reason: SDUPTHER

## 2020-01-08 RX ORDER — GLYCOPYRROLATE 1 MG/5 ML
SYRINGE (ML) INTRAVENOUS PRN
Status: DISCONTINUED | OUTPATIENT
Start: 2020-01-08 | End: 2020-01-08 | Stop reason: SDUPTHER

## 2020-01-08 RX ORDER — PROPOFOL 10 MG/ML
INJECTION, EMULSION INTRAVENOUS PRN
Status: DISCONTINUED | OUTPATIENT
Start: 2020-01-08 | End: 2020-01-08 | Stop reason: SDUPTHER

## 2020-01-08 RX ORDER — HYDROCODONE BITARTRATE AND ACETAMINOPHEN 5; 325 MG/1; MG/1
1 TABLET ORAL ONCE
Status: COMPLETED | OUTPATIENT
Start: 2020-01-08 | End: 2020-01-08

## 2020-01-08 RX ORDER — DEXAMETHASONE SODIUM PHOSPHATE 10 MG/ML
INJECTION INTRAMUSCULAR; INTRAVENOUS PRN
Status: DISCONTINUED | OUTPATIENT
Start: 2020-01-08 | End: 2020-01-08 | Stop reason: SDUPTHER

## 2020-01-08 RX ORDER — FENTANYL CITRATE 50 UG/ML
25 INJECTION, SOLUTION INTRAMUSCULAR; INTRAVENOUS EVERY 5 MIN PRN
Status: COMPLETED | OUTPATIENT
Start: 2020-01-08 | End: 2020-01-08

## 2020-01-08 RX ORDER — MAGNESIUM HYDROXIDE 1200 MG/15ML
LIQUID ORAL CONTINUOUS PRN
Status: COMPLETED | OUTPATIENT
Start: 2020-01-08 | End: 2020-01-08

## 2020-01-08 RX ORDER — NEOSTIGMINE METHYLSULFATE 5 MG/5 ML
SYRINGE (ML) INTRAVENOUS PRN
Status: DISCONTINUED | OUTPATIENT
Start: 2020-01-08 | End: 2020-01-08 | Stop reason: SDUPTHER

## 2020-01-08 RX ORDER — MAGNESIUM HYDROXIDE 1200 MG/15ML
LIQUID ORAL PRN
Status: DISCONTINUED | OUTPATIENT
Start: 2020-01-08 | End: 2020-01-08 | Stop reason: ALTCHOICE

## 2020-01-08 RX ORDER — LIDOCAINE HYDROCHLORIDE 10 MG/ML
INJECTION, SOLUTION EPIDURAL; INFILTRATION; INTRACAUDAL; PERINEURAL PRN
Status: DISCONTINUED | OUTPATIENT
Start: 2020-01-08 | End: 2020-01-08 | Stop reason: SDUPTHER

## 2020-01-08 RX ORDER — SODIUM CHLORIDE, SODIUM LACTATE, POTASSIUM CHLORIDE, CALCIUM CHLORIDE 600; 310; 30; 20 MG/100ML; MG/100ML; MG/100ML; MG/100ML
INJECTION, SOLUTION INTRAVENOUS CONTINUOUS
Status: DISCONTINUED | OUTPATIENT
Start: 2020-01-08 | End: 2020-01-08 | Stop reason: HOSPADM

## 2020-01-08 RX ORDER — HYDROCODONE BITARTRATE AND ACETAMINOPHEN 5; 325 MG/1; MG/1
1 TABLET ORAL EVERY 6 HOURS PRN
Qty: 18 TABLET | Refills: 0 | Status: SHIPPED | OUTPATIENT
Start: 2020-01-08 | End: 2020-01-13

## 2020-01-08 RX ORDER — CIPROFLOXACIN 2 MG/ML
400 INJECTION, SOLUTION INTRAVENOUS
Status: DISCONTINUED | OUTPATIENT
Start: 2020-01-08 | End: 2020-01-08 | Stop reason: HOSPADM

## 2020-01-08 RX ORDER — CIPROFLOXACIN 500 MG/1
500 TABLET, FILM COATED ORAL 2 TIMES DAILY
Qty: 6 TABLET | Refills: 0 | Status: SHIPPED | OUTPATIENT
Start: 2020-01-08 | End: 2020-01-11

## 2020-01-08 RX ORDER — CIPROFLOXACIN 2 MG/ML
INJECTION, SOLUTION INTRAVENOUS PRN
Status: DISCONTINUED | OUTPATIENT
Start: 2020-01-08 | End: 2020-01-08 | Stop reason: SDUPTHER

## 2020-01-08 RX ORDER — FENTANYL CITRATE 50 UG/ML
50 INJECTION, SOLUTION INTRAMUSCULAR; INTRAVENOUS EVERY 5 MIN PRN
Status: COMPLETED | OUTPATIENT
Start: 2020-01-08 | End: 2020-01-08

## 2020-01-08 RX ADMIN — ROCURONIUM BROMIDE 30 MG: 10 INJECTION INTRAVENOUS at 12:58

## 2020-01-08 RX ADMIN — DEXAMETHASONE SODIUM PHOSPHATE 10 MG: 10 INJECTION INTRAMUSCULAR; INTRAVENOUS at 13:04

## 2020-01-08 RX ADMIN — HYDROCODONE BITARTRATE AND ACETAMINOPHEN 1 TABLET: 5; 325 TABLET ORAL at 15:57

## 2020-01-08 RX ADMIN — GENTAMICIN SULFATE 120 MG: 60 INJECTION, SOLUTION INTRAVENOUS at 13:34

## 2020-01-08 RX ADMIN — Medication 0.4 MG: at 13:32

## 2020-01-08 RX ADMIN — FENTANYL CITRATE 50 MCG: 50 INJECTION, SOLUTION INTRAMUSCULAR; INTRAVENOUS at 15:05

## 2020-01-08 RX ADMIN — FENTANYL CITRATE 25 MCG: 50 INJECTION, SOLUTION INTRAMUSCULAR; INTRAVENOUS at 14:15

## 2020-01-08 RX ADMIN — FENTANYL CITRATE 50 MCG: 50 INJECTION, SOLUTION INTRAMUSCULAR; INTRAVENOUS at 14:35

## 2020-01-08 RX ADMIN — FENTANYL CITRATE 25 MCG: 50 INJECTION, SOLUTION INTRAMUSCULAR; INTRAVENOUS at 14:25

## 2020-01-08 RX ADMIN — Medication 0.4 MG: at 13:45

## 2020-01-08 RX ADMIN — Medication 3.5 MG: at 13:45

## 2020-01-08 RX ADMIN — SODIUM CHLORIDE, POTASSIUM CHLORIDE, SODIUM LACTATE AND CALCIUM CHLORIDE: 600; 310; 30; 20 INJECTION, SOLUTION INTRAVENOUS at 12:02

## 2020-01-08 RX ADMIN — FENTANYL CITRATE 50 MCG: 50 INJECTION, SOLUTION INTRAMUSCULAR; INTRAVENOUS at 15:15

## 2020-01-08 RX ADMIN — FENTANYL CITRATE 25 MCG: 50 INJECTION, SOLUTION INTRAMUSCULAR; INTRAVENOUS at 14:30

## 2020-01-08 RX ADMIN — ONDANSETRON 4 MG: 2 INJECTION, SOLUTION INTRAMUSCULAR; INTRAVENOUS at 13:45

## 2020-01-08 RX ADMIN — CIPROFLOXACIN 400 MG: 2 INJECTION, SOLUTION INTRAVENOUS at 13:12

## 2020-01-08 RX ADMIN — FENTANYL CITRATE 25 MCG: 50 INJECTION, SOLUTION INTRAMUSCULAR; INTRAVENOUS at 14:20

## 2020-01-08 RX ADMIN — PROPOFOL 150 MG: 10 INJECTION, EMULSION INTRAVENOUS at 12:58

## 2020-01-08 RX ADMIN — FENTANYL CITRATE 50 MCG: 50 INJECTION, SOLUTION INTRAMUSCULAR; INTRAVENOUS at 15:10

## 2020-01-08 RX ADMIN — LIDOCAINE HYDROCHLORIDE 50 MG: 10 INJECTION, SOLUTION EPIDURAL; INFILTRATION; INTRACAUDAL; PERINEURAL at 12:58

## 2020-01-08 ASSESSMENT — PULMONARY FUNCTION TESTS
PIF_VALUE: 1
PIF_VALUE: 22
PIF_VALUE: 25
PIF_VALUE: 27
PIF_VALUE: 25
PIF_VALUE: 27
PIF_VALUE: 25
PIF_VALUE: 1
PIF_VALUE: 25
PIF_VALUE: 17
PIF_VALUE: 23
PIF_VALUE: 24
PIF_VALUE: 33
PIF_VALUE: 25
PIF_VALUE: 2
PIF_VALUE: 25
PIF_VALUE: 26
PIF_VALUE: 25
PIF_VALUE: 26
PIF_VALUE: 22
PIF_VALUE: 24
PIF_VALUE: 25
PIF_VALUE: 26
PIF_VALUE: 1
PIF_VALUE: 23
PIF_VALUE: 25
PIF_VALUE: 1
PIF_VALUE: 27
PIF_VALUE: 25
PIF_VALUE: 25
PIF_VALUE: 16
PIF_VALUE: 25
PIF_VALUE: 25
PIF_VALUE: 0
PIF_VALUE: 25
PIF_VALUE: 1
PIF_VALUE: 22
PIF_VALUE: 25
PIF_VALUE: 1
PIF_VALUE: 25
PIF_VALUE: 29
PIF_VALUE: 26
PIF_VALUE: 26
PIF_VALUE: 27
PIF_VALUE: 25
PIF_VALUE: 27
PIF_VALUE: 26
PIF_VALUE: 25
PIF_VALUE: 0
PIF_VALUE: 3
PIF_VALUE: 0
PIF_VALUE: 25
PIF_VALUE: 25
PIF_VALUE: 26
PIF_VALUE: 25
PIF_VALUE: 26
PIF_VALUE: 25
PIF_VALUE: 28
PIF_VALUE: 24
PIF_VALUE: 26
PIF_VALUE: 27
PIF_VALUE: 25
PIF_VALUE: 24
PIF_VALUE: 2
PIF_VALUE: 17

## 2020-01-08 ASSESSMENT — PAIN DESCRIPTION - DESCRIPTORS
DESCRIPTORS: BURNING

## 2020-01-08 ASSESSMENT — ENCOUNTER SYMPTOMS: SHORTNESS OF BREATH: 0

## 2020-01-08 ASSESSMENT — PAIN DESCRIPTION - PAIN TYPE: TYPE: SURGICAL PAIN

## 2020-01-08 ASSESSMENT — PAIN SCALES - GENERAL
PAINLEVEL_OUTOF10: 5
PAINLEVEL_OUTOF10: 4
PAINLEVEL_OUTOF10: 5
PAINLEVEL_OUTOF10: 7
PAINLEVEL_OUTOF10: 7
PAINLEVEL_OUTOF10: 5
PAINLEVEL_OUTOF10: 4
PAINLEVEL_OUTOF10: 4
PAINLEVEL_OUTOF10: 5
PAINLEVEL_OUTOF10: 7
PAINLEVEL_OUTOF10: 4
PAINLEVEL_OUTOF10: 7
PAINLEVEL_OUTOF10: 7
PAINLEVEL_OUTOF10: 5

## 2020-01-08 ASSESSMENT — PAIN - FUNCTIONAL ASSESSMENT: PAIN_FUNCTIONAL_ASSESSMENT: 0-10

## 2020-01-08 NOTE — OP NOTE
Dr. Piedad House MD      [address] [facility]. [place]    DATE:  1/8/2020    SURGEON:   Piedad House MD    ASSISTANT:  Estefany Pinto MD.      PREOPERATIVE DIAGNOSIS:  LEFT KIDNEY STONE, HEMATURIA  Coagulopathy on Coumadin    POSTOPERATIVE DIAGNOSIS:  same    PROCEDURE PERFORMED:  CYSTOSCOPY, RIGHT RETROGRADE PYELOGRAM, LEFT URETEROSCOPY, HOLMIUM LASER LITHOTRIPSY, LEFT URETERAL STENT PLACEMENT, STONE BASKETING     ANESTHESIA:  General    COMPLICATIONS:  None. ESTIMATED BLOOD LOSS:  5 cc    SPECIMENS:  ID Type Source Tests Collected by Time Destination   1 : LEFT RENAL STONE Stone (Calculus) Rosendo Thomas MD 1/8/2020 1341         DRAINS:  left 6x26 stent      INDICATIONS FOR PROCEDURE:  The patient is a 67 y.o. female with a history of kidney stones and hematuria who is here today definitive treatment of stone burden. CT showed a large stone burden >1 cm in the renal pelvis, mid and lower pole. The risks and benefits of the procedure as well as possible alternatives and complications were discussed and she consented    DETAILS OF THE PROCEDURE:  The patient was correctly identified in the preoperative holding area. she was brought back to the operating room andGeneral was administered. she was prepped and draped in the usual sterile fashion in the dorsal lithotomy position. EPC cuffs were on, in place, and fully functional. she was given 400mg ciprofloxacin IV  And 120 mg Gentamicin for antibiotic prophylaxis. After appropriate time-out was performed with all parties agreeing, a 22Fr cystoscope with a 30 degree lens was inserted into the bladder. A thorough and complete cystoscopy was performed which showed some edema of the trigone and anterior bladder wall with changes consistent with cystitis cystica. There were no tumors or bladder stones or foreign bodies. The ureteral orifice ease were in their orthotopic location.   We inserted a dual-lumen catheter into the right ureteral orifice and performed a right retrograde pyelogram.  There were no filling defects observed. The ureter did take a slightly tortuous course appearing to cross to midline. A 0.035 glidewire was inserted through the scope and visualized in the left renal pelvis under fluoroscopy. a dual lumen catheter was inserted over the wire. A second glidewire was inserted under fluoroscopic guidance and the catheter was removed. I then an 11 fr by 13 [place] by 26 cm was inserted into the ureter under fluoroscopic guidance. A flexible ureteroscope was inserted and advanced into the left  ureter. There were a few stone(s) located in the mid pole, lower pole and pelvis. a complete pyeloscopy was performed with no tumors seen. A 200micron holmium laser fiber was inserted and used to fragment the stone into submillimeter sized pieces. The scope was advanced to the pelvis and a complete pyeloscopy was performed with no additional stones visualized. Were able to basket out a few stones with a 0 tip nitinol basket, however majority the stones were submillimeter and were not basketable. Stones were sent for analysis. The scope was withdrawn and the ureter was clear of stones. The cystoscope was then backloaded over the wire and a 6x26 stent was advanced with good curl visualized in the renal pelvis under fluoroscopy and in the bladder under direct visualization. The bladder was drained and the scope was removed. Stent strings were secured to the mons pubis with Steri-Strips and benzoin. The patient tolerated the procedure well and was sent to PACU for postoperative monitoring. DISPOSITION:  The patient was discharged home in stable condition with instructions to follow up in 6 weeks for further evaluation of her symptoms. She was instructed to remove the stent via the externalized string in 5 days.

## 2020-01-08 NOTE — ANESTHESIA POSTPROCEDURE EVALUATION
Department of Anesthesiology  Postprocedure Note    Patient: Verna Cervantes  MRN: [id number]  YOB: 1940  Date of evaluation: 1/8/2020  Time:  5:11 PM     Procedure Summary     Date:  01/08/20 Room / Location:  [facility] 01 / [address] [facility]    Anesthesia Start:  1250 Anesthesia Stop:  0597    Procedures:       CYSTOSCOPY, RIGHT RETROGRADE PYELOGRAM (Bilateral )      URETEROSCOPY, HOLMIUM LASER LITHOTRIPSY, LEFT STENT PLACEMENT, STONE BASKETING (Right ) Diagnosis:  (LEFT KIDNEY STONE, HEMATURIA)    Surgeon:  Ying Romero MD Responsible Provider:  Antonio Valencia MD    Anesthesia Type:  general ASA Status:  3          Anesthesia Type: general    Benny Phase I: Benny Score: 10    Benny Phase II: Benny Score: 10    Last vitals: Reviewed and per EMR flowsheets.        Anesthesia Post Evaluation    Patient location during evaluation: PACU  Patient participation: complete - patient participated  Level of consciousness: awake and alert  Pain score: 1  Airway patency: patent  Nausea & Vomiting: no nausea and no vomiting  Complications: no  Cardiovascular status: hemodynamically stable  Respiratory status: acceptable  Hydration status: euvolemic

## 2020-01-08 NOTE — H&P
every 8 hours as needed. OTC    Yes Júnior Castaneda MD   dicyclomine (BENTYL) 20 MG tablet TAKE 1 TABLET EVERY 6 HOURS 8/27/18   Júnior Castaneda MD   [facility] [place] [facility]. Devices KIT 1 each by Does not apply route continuous Admit to skilled nursing facility for respite care. 6/1/17   Júnior Castaneda MD       Allergies:  Morphine; Pcn [penicillins]; Sulfa antibiotics;  Nitrofurantoin monohyd macro; and Ceftriaxone    Social History:    Social History     Socioeconomic History    Marital status:      Spouse name: Not on file    Number of children: Not on file    Years of education: Not on file    Highest education level: Not on file   Occupational History    Not on file   Social Needs    Financial resource strain: Not on file    Food insecurity:     Worry: Not on file     Inability: Not on file    Transportation needs:     Medical: Not on file     Non-medical: Not on file   Tobacco Use    Smoking status: Never Smoker    Smokeless tobacco: Never Used   Substance and Sexual Activity    Alcohol use: No     Alcohol/week: 0.0 standard drinks    Drug use: No    Sexual activity: Not Currently   Lifestyle    Physical activity:     Days per week: Not on file     Minutes per session: Not on file    Stress: Not on file   Relationships    Social connections:     Talks on phone: Not on file     Gets together: Not on file     Attends Orthodoxy service: Not on file     Active member of club or organization: Not on file     Attends meetings of clubs or organizations: Not on file     Relationship status: Not on file    Intimate partner violence:     Fear of current or ex partner: Not on file     Emotionally abused: Not on file     Physically abused: Not on file     Forced sexual activity: Not on file   Other Topics Concern    Not on file   Social History Narrative    Not on file       Family History:    Family History   Problem Relation Age of Onset    High Blood Pressure Father     Heart Disease Father     Stroke Father     Diabetes Brother     High Blood Pressure Brother        REVIEW OF SYSTEMS:  Constitutional: negative  Eyes: negative  Respiratory: negative  Cardiovascular: negative  Gastrointestinal: negative  Genitourinary: no acute issues  Musculoskeletal: negative  Skin: negative   Neurological: negative  Hematological/Lymphatic: negative  Psychological: negative    PHYSICAL EXAM:    No data found. Constitutional: Patient in NAD  Neuro: Alert and oriented to person, place, and time  Psych: Mood and affect normal  Skin: Clean, dry, intact   Lungs: Respiratory effort normal, CTA  Cardiovascular:  Normal peripheral pulses; no murmur. Normal rhythm  Abdomen: Soft, non-tender, non-distended, no hepatosplenomegaly or hernia, CVA tenderness None  Bladder: Non-tender and non-disdended   : Non-tender, skin intact, no lesions       LABS:   No results for input(s): WBC, HGB, HCT, MCV, PLT in the last 72 hours. No results for input(s): NA, K, CL, CO2, PHOS, BUN, CREATININE in the last 72 hours. Invalid input(s): CA  No results found for: PSA      Urinalysis: No results for input(s): COLORU, PHUR, LABCAST, WBCUA, RBCUA, MUCUS, TRICHOMONAS, YEAST, BACTERIA, CLARITYU, SPECGRAV, LEUKOCYTESUR, UROBILINOGEN, Laz Bis in the last 72 hours.     Invalid input(s): NITRATE, GLUCOSEUKETONESUAMORPHOUS     -----------------------------------------------------------------    ASSESSMENT AND PLAN:    Impression:    Left renal staghorn calculi  Gross hematuria    Patient Active Problem List   Diagnosis    Obesity, morbid (more than 100 lbs over ideal weight or BMI > 40) ([facility])    HTN (hypertension)    Cellulitis of leg    Lymphedema of leg    Stasis dermatitis    Dyslipidemia    CKD (chronic kidney disease)    Depression    Anxiety    Gout    Chronic calculous cholecystitis    Morbid obesity with BMI of 50.0-59.9, adult ([facility])    CKD (chronic kidney disease) stage 3, GFR 30-59 ml/min ([facility])    Benign essential HTN  Hypokalemia    Status post laparoscopic cholecystectomy    Bile leak    Chronic cholecystitis with calculus    Stones common duct    Idiopathic gout    Lymphedema of both lower extremities    Venous stasis dermatitis of both lower extremities    Essential hypertension    Cardiac arrhythmia    Atrial flutter (HCC)    Recurrent major depressive disorder, in partial remission (HCC)    Mastoiditis of right side    Cellulitis of face    Acute parotitis    Atypical atrial flutter (HCC)       Plan: Cystoscopy, bilateral retrograde pyelogram, Left URS, HLL, stent placement in OR today.     Consent obtained    Boaz Bruce PA-C  11:04 AM 1/8/2020

## 2020-01-08 NOTE — ANESTHESIA PRE PROCEDURE
medications for this visit. No current outpatient medications on file. Facility-Administered Medications Ordered in Other Visits   Medication Dose Route Frequency Provider Last Rate Last Dose    ciprofloxacin (CIPRO) IVPB 400 mg  400 mg Intravenous On Call to Jennifer Leavitt MD        lactated ringers infusion   Intravenous Continuous Jovana Cox MD        ciprofloxacin (CIPRO) IVPB 400 mg  400 mg Intravenous On Call to [address] [facility],[address], [place]-C           Allergies: Allergies   Allergen Reactions    Morphine     Pcn [Penicillins]     Sulfa Antibiotics     Nitrofurantoin Monohyd Macro Rash    Ceftriaxone        Problem List:    Patient Active Problem List   Diagnosis Code    Obesity, morbid (more than 100 lbs over ideal weight or BMI > 40) ([facility]) E66.01    HTN (hypertension) I10    Cellulitis of leg L03.119    Lymphedema of leg I89.0    Stasis dermatitis I87.2    Dyslipidemia E78.5    CKD (chronic kidney disease) N18.9    Depression F32.9    Anxiety F41.9    Gout M10.9    Chronic calculous cholecystitis K80.10    Morbid obesity with BMI of 50.0-59.9, adult ([facility]) E66.01, Z68.43    CKD (chronic kidney disease) stage 3, GFR 30-59 ml/min ([facility]) N18.3    Benign essential HTN I10    Hypokalemia E87.6    Status post laparoscopic cholecystectomy Z90.49    Bile leak K83.9    Chronic cholecystitis with calculus K80.10    Stones common duct K80.50    Idiopathic gout M10.00    Lymphedema of both lower extremities I89.0    Venous stasis dermatitis of both lower extremities I87.2    Essential hypertension I10    Cardiac arrhythmia I49.9    Atrial flutter ([facility]) I48.92    Recurrent major depressive disorder, in partial remission ([facility]) F33.41    Mastoiditis of right side H70.91    Cellulitis of face L03. 211    Acute parotitis K11.21    Atypical atrial flutter ([facility]) I48.4       Past Medical History:        Diagnosis Date    Anticoagulated on Coumadin 2015    FOR ATRIAL Social History:    Social History     Tobacco Use    Smoking status: Never Smoker    Smokeless tobacco: Never Used   Substance Use Topics    Alcohol use: No     Alcohol/week: 0.0 standard drinks                                Counseling given: Not Answered      Vital Signs (Current): There were no vitals filed for this visit. BP Readings from Last 3 Encounters:   12/12/19 126/82   12/04/19 126/66   11/25/19 124/80       NPO Status:                                                                                 BMI:   Wt Readings from Last 3 Encounters:   01/06/20 (!) 307 lb (139.3 kg)   12/04/19 (!) 302 lb (137 kg)   11/25/19 (!) 304 lb 14.3 oz (138.3 kg)     There is no height or weight on file to calculate BMI.    CBC:   Lab Results   Component Value Date    WBC 9.3 11/24/2019    RBC 4.39 11/24/2019    RBC 3.86 05/16/2012    HGB 13.9 11/24/2019    HCT 42.1 11/24/2019    MCV 95.9 11/24/2019    RDW 15.2 11/24/2019     11/24/2019     05/16/2012       CMP:   Lab Results   Component Value Date     11/24/2019    K 3.5 11/24/2019     11/24/2019    CO2 29 11/24/2019    BUN 20 11/24/2019    CREATININE 1.34 11/24/2019    GFRAA 46 11/24/2019    LABGLOM 38 11/24/2019    GLUCOSE 132 11/24/2019    GLUCOSE 89 05/16/2012    PROT 7.1 11/24/2019    PROT 6.6 01/12/2012    CALCIUM 9.7 11/24/2019    BILITOT 0.22 11/24/2019    ALKPHOS 103 11/24/2019    AST 18 11/24/2019    ALT 15 11/24/2019       POC Tests: No results for input(s): POCGLU, POCNA, POCK, POCCL, POCBUN, POCHEMO, POCHCT in the last 72 hours.     Coags:   Lab Results   Component Value Date    PROTIME 19.9 01/06/2020    PROTIME 57.2 12/04/2019    INR 2.0 01/06/2020    APTT 50.6 12/16/2018       HCG (If Applicable): No results found for: PREGTESTUR, PREGSERUM, HCG, HCGQUANT     ABGs: No results found for: PHART, PO2ART, CHH8IXE, GJO3XRL, BEART, T7IJJYOS     Type & Screen (If Applicable):  No results found for: LEONOR, Felipe [address]    Anesthesia Evaluation  Patient summary reviewed and Nursing notes reviewed no history of anesthetic complications:   Airway: Mallampati: II     Neck ROM: full   Dental:    (+) upper dentures and lower dentures      Pulmonary: breath sounds clear to auscultation      (-) COPD, asthma, shortness of breath, recent URI and sleep apnea                           Cardiovascular:Negative CV ROS  Exercise tolerance: good (>4 METS),   (+) hypertension:, dysrhythmias: atrial flutter, hyperlipidemia    (-) past MI, CAD, CABG/stent,  angina,  CHF, orthopnea and  CAMACHO      Rhythm: irregular  Rate: abnormal           Beta Blocker:  Not on Beta Blocker and Dose within 24 Hrs         Neuro/Psych:   Negative Neuro/Psych ROS  (+) headaches: migraine headaches, psychiatric history:depression/anxiety    (-) seizures, TIA and CVA            ROS comment: Fibromyalgia GI/Hepatic/Renal: Neg GI/Hepatic/Renal ROS  (+) renal disease: kidney stones and CRI, morbid obesity     (-) GERD       Endo/Other:    (+) : arthritis (gout):., .    (-) diabetes mellitus               Abdominal:           Vascular: negative vascular ROS. NM stress 12/3/2019  Negative electrocardiographic portion of the Lexiscan stress test. Nuclear Medicine report to follow. 1. No definitive scintigraphic evidence for reversible ischemia or infarct. 2. Left ventricular ejection fraction of 70%. TTE 12/3/2019  Left ventricle is normal in size, normal wall thickness, global left ventricular systolic function is normal, calculated ejection fraction is 50%. Grade II (moderate) left ventricular diastolic dysfunction. Left atrium is moderately dilated. Inter-atrial septum is intact with no evidence for an atrial septal defect. Right atrial dilatation. Trivial mitral regurgitation. Tricuspid valve was not well visualized. Mild tricuspid regurgitation.   Estimated right ventricular systolic pressure is 28 mmHg.           Anesthesia Plan      general     ASA 3     (GA ET)  Induction: intravenous. Anesthetic plan and risks discussed with patient.       Plan discussed with CRNA and surgical team.                  Lupe Jones MD   1/8/2020

## 2020-01-10 LAB
STONE COMPOSITION: NORMAL
STONE DESCRIPTION: NORMAL
STONE MASS: 3 MG
STONE NUMBER: 3
STONE SIZE: NORMAL MM

## 2020-01-20 ENCOUNTER — HOSPITAL ENCOUNTER (OUTPATIENT)
Age: 80
Setting detail: SPECIMEN
Discharge: HOME | End: 2020-01-20
Payer: MEDICARE

## 2020-01-20 LAB
INR BLD: 1.6
PROTHROMBIN TIME: 16 SEC (ref 9–12)

## 2020-01-27 ENCOUNTER — HOSPITAL ENCOUNTER (OUTPATIENT)
Age: 80
Setting detail: SPECIMEN
Discharge: HOME | End: 2020-01-27
Payer: MEDICARE

## 2020-01-27 LAB
INR BLD: 2.2
PROTHROMBIN TIME: 21.7 SEC (ref 9–12)

## 2020-01-30 ENCOUNTER — TELEPHONE (OUTPATIENT)
Dept: UROLOGY | Age: 80
End: 2020-01-30

## 2020-01-30 NOTE — TELEPHONE ENCOUNTER
Called patient to schedule 6 week f/u with [facility]UB s/p URS, HLL and stent placement. Pt is refusing a follow-up appt stating, \"my  and I are both disabled and don't drive. I'm not having any problems and would rather not follow-up at this time. \"   Yanet Bailey states this would be discussed with Dr Becky Ervin and would return her call. Pt verbalized understanding.

## 2020-02-11 ENCOUNTER — HOSPITAL ENCOUNTER (OUTPATIENT)
Age: 80
Setting detail: SPECIMEN
Discharge: HOME | End: 2020-02-11
Payer: MEDICARE

## 2020-02-11 LAB
INR BLD: 2.8
PROTHROMBIN TIME: 27 SEC (ref 9–12)

## 2020-02-26 ENCOUNTER — HOSPITAL ENCOUNTER (OUTPATIENT)
Age: 80
Setting detail: SPECIMEN
Discharge: HOME | End: 2020-02-26
Payer: MEDICARE

## 2020-02-26 LAB
INR BLD: 2.9
PROTHROMBIN TIME: 28 SEC (ref 9–12)

## 2020-03-12 ENCOUNTER — HOSPITAL ENCOUNTER (OUTPATIENT)
Age: 80
Setting detail: SPECIMEN
Discharge: HOME | End: 2020-03-12
Payer: MEDICARE

## 2020-03-12 LAB
INR BLD: 2.7
PROTHROMBIN TIME: 27.2 SEC (ref 9–12)

## 2020-05-13 ENCOUNTER — HOSPITAL ENCOUNTER (OUTPATIENT)
Age: 80
Setting detail: SPECIMEN
Discharge: HOME | End: 2020-05-13
Payer: MEDICARE

## 2020-05-13 LAB
INR BLD: 3.3
PROTHROMBIN TIME: 32.7 SEC (ref 9–12)

## 2020-05-21 ENCOUNTER — HOSPITAL ENCOUNTER (OUTPATIENT)
Age: 80
Setting detail: SPECIMEN
Discharge: HOME | End: 2020-05-21
Payer: MEDICARE

## 2020-05-21 LAB
INR BLD: 1.9
PROTHROMBIN TIME: 19.6 SEC (ref 9–12)

## 2020-06-04 ENCOUNTER — HOSPITAL ENCOUNTER (OUTPATIENT)
Age: 80
Setting detail: SPECIMEN
Discharge: HOME | End: 2020-06-04
Payer: MEDICARE

## 2020-06-04 LAB
INR BLD: 2.4
PROTHROMBIN TIME: 24.3 SEC (ref 9–12)

## 2020-06-18 ENCOUNTER — HOSPITAL ENCOUNTER (OUTPATIENT)
Age: 80
Setting detail: SPECIMEN
Discharge: HOME | End: 2020-06-18
Payer: MEDICARE

## 2020-06-18 LAB
INR BLD: 2.5
PROTHROMBIN TIME: 25.4 SEC (ref 9–12)

## 2020-07-16 ENCOUNTER — HOSPITAL ENCOUNTER (OUTPATIENT)
Age: 80
Setting detail: SPECIMEN
Discharge: HOME | End: 2020-07-16
Payer: MEDICARE

## 2020-07-16 LAB
INR BLD: 2.1
PROTHROMBIN TIME: 21 SEC (ref 9–12)

## 2020-08-17 ENCOUNTER — HOSPITAL ENCOUNTER (OUTPATIENT)
Age: 80
Setting detail: SPECIMEN
Discharge: HOME | End: 2020-08-17
Payer: MEDICARE

## 2020-08-17 LAB
INR BLD: 2.1
PROTHROMBIN TIME: 21.3 SEC (ref 9–12)

## 2020-09-16 ENCOUNTER — HOSPITAL ENCOUNTER (OUTPATIENT)
Age: 80
Setting detail: SPECIMEN
Discharge: HOME | End: 2020-09-16
Payer: MEDICARE

## 2020-09-16 LAB
INR BLD: 1.8
PROTHROMBIN TIME: 18.6 SEC (ref 9–12)

## 2020-09-23 ENCOUNTER — HOSPITAL ENCOUNTER (OUTPATIENT)
Age: 80
Setting detail: SPECIMEN
Discharge: HOME | End: 2020-09-23
Payer: MEDICARE

## 2020-09-23 LAB
INR BLD: 2.9
PROTHROMBIN TIME: 29.3 SEC (ref 9–12)

## 2020-10-07 ENCOUNTER — HOSPITAL ENCOUNTER (OUTPATIENT)
Age: 80
Setting detail: SPECIMEN
Discharge: HOME | End: 2020-10-07
Payer: MEDICARE

## 2020-10-07 LAB
INR BLD: 1.9
PROTHROMBIN TIME: 19.3 SEC (ref 9–12)

## 2020-10-21 ENCOUNTER — HOSPITAL ENCOUNTER (OUTPATIENT)
Age: 80
Setting detail: SPECIMEN
Discharge: HOME | End: 2020-10-21
Payer: MEDICARE

## 2020-10-21 LAB
INR BLD: 2.4
PROTHROMBIN TIME: 24.3 SEC (ref 9–12)

## 2020-11-18 ENCOUNTER — HOSPITAL ENCOUNTER (OUTPATIENT)
Age: 80
Setting detail: SPECIMEN
Discharge: HOME | End: 2020-11-18
Payer: MEDICARE

## 2020-11-18 LAB
INR BLD: 2.5
PROTHROMBIN TIME: 25.6 SEC (ref 9–12)

## 2020-12-18 ENCOUNTER — HOSPITAL ENCOUNTER (OUTPATIENT)
Age: 80
Setting detail: SPECIMEN
Discharge: HOME | End: 2020-12-18
Payer: MEDICARE

## 2020-12-18 LAB
INR BLD: 2
PROTHROMBIN TIME: 20.8 SEC (ref 9–12)

## 2021-01-18 ENCOUNTER — HOSPITAL ENCOUNTER (OUTPATIENT)
Age: 81
Setting detail: SPECIMEN
Discharge: HOME | End: 2021-01-18
Payer: MEDICARE

## 2021-01-18 LAB
INR BLD: 2.9
PROTHROMBIN TIME: 29.2 SEC (ref 9–12)

## 2021-02-17 ENCOUNTER — HOSPITAL ENCOUNTER (OUTPATIENT)
Age: 81
Setting detail: SPECIMEN
Discharge: HOME | End: 2021-02-17
Payer: MEDICARE

## 2021-02-17 LAB
INR BLD: 2.8
PROTHROMBIN TIME: 27.7 SEC (ref 9.1–12.3)

## 2021-03-17 ENCOUNTER — HOSPITAL ENCOUNTER (OUTPATIENT)
Age: 81
Setting detail: SPECIMEN
Discharge: HOME | End: 2021-03-17
Payer: MEDICARE

## 2021-03-17 LAB
INR BLD: 2.4
PROTHROMBIN TIME: 24 SEC (ref 9.1–12.3)

## 2021-04-15 ENCOUNTER — HOSPITAL ENCOUNTER (OUTPATIENT)
Age: 81
Setting detail: SPECIMEN
Discharge: HOME | End: 2021-04-15
Payer: MEDICARE

## 2021-04-15 LAB
INR BLD: 2.1
PROTHROMBIN TIME: 21.1 SEC (ref 9.1–12.3)

## 2021-05-13 ENCOUNTER — HOSPITAL ENCOUNTER (OUTPATIENT)
Age: 81
Setting detail: SPECIMEN
Discharge: HOME | End: 2021-05-13
Payer: MEDICARE

## 2021-05-13 LAB
INR BLD: 3.1
PROTHROMBIN TIME: 30.6 SEC (ref 9.1–12.3)

## 2021-05-28 ENCOUNTER — HOSPITAL ENCOUNTER (OUTPATIENT)
Age: 81
Setting detail: SPECIMEN
Discharge: HOME | End: 2021-05-28
Payer: MEDICARE

## 2021-05-28 LAB
INR BLD: 3.6
PROTHROMBIN TIME: 34.8 SEC (ref 9.1–12.3)

## 2021-06-05 ENCOUNTER — HOSPITAL ENCOUNTER (OUTPATIENT)
Age: 81
Setting detail: SPECIMEN
Discharge: HOME | End: 2021-06-05
Payer: MEDICARE

## 2021-06-05 LAB
INR BLD: 2.6
PROTHROMBIN TIME: 26.1 SEC (ref 9.1–12.3)

## 2021-06-18 ENCOUNTER — HOSPITAL ENCOUNTER (OUTPATIENT)
Age: 81
Setting detail: SPECIMEN
Discharge: HOME | End: 2021-06-18
Payer: MEDICARE

## 2021-06-18 LAB
INR BLD: 2.3
PROTHROMBIN TIME: 23.3 SEC (ref 9.1–12.3)

## 2021-07-19 ENCOUNTER — HOSPITAL ENCOUNTER (OUTPATIENT)
Age: 81
Setting detail: SPECIMEN
Discharge: HOME | End: 2021-07-19
Payer: MEDICARE

## 2021-07-19 LAB
INR BLD: 2.7
PROTHROMBIN TIME: 26.7 SEC (ref 9.1–12.3)

## 2021-08-16 ENCOUNTER — HOSPITAL ENCOUNTER (OUTPATIENT)
Age: 81
Setting detail: SPECIMEN
Discharge: HOME | End: 2021-08-16
Payer: MEDICARE

## 2021-08-16 LAB
INR BLD: 2.6
PROTHROMBIN TIME: 25.5 SEC (ref 9.1–12.3)

## 2021-09-17 ENCOUNTER — HOSPITAL ENCOUNTER (OUTPATIENT)
Age: 81
Setting detail: SPECIMEN
Discharge: HOME | End: 2021-09-17
Payer: MEDICARE

## 2021-09-17 LAB
INR BLD: 2.5
PROTHROMBIN TIME: 24.5 SEC (ref 9.1–12.3)

## 2021-10-18 ENCOUNTER — HOSPITAL ENCOUNTER (OUTPATIENT)
Age: 81
Setting detail: SPECIMEN
Discharge: HOME | End: 2021-10-18
Payer: MEDICARE

## 2021-10-18 LAB
INR BLD: 1.4
PROTHROMBIN TIME: 14.6 SEC (ref 9.1–12.3)

## 2021-10-25 ENCOUNTER — HOSPITAL ENCOUNTER (OUTPATIENT)
Age: 81
Setting detail: SPECIMEN
Discharge: HOME | End: 2021-10-25
Payer: MEDICARE

## 2021-10-25 LAB
INR BLD: 2
PROTHROMBIN TIME: 19.9 SEC (ref 9.1–12.3)

## 2021-11-04 ENCOUNTER — HOSPITAL ENCOUNTER (OUTPATIENT)
Age: 81
Setting detail: SPECIMEN
Discharge: HOME | End: 2021-11-04
Payer: MEDICARE

## 2021-11-04 LAB
INR BLD: 3.3
PROTHROMBIN TIME: 31.8 SEC (ref 9.1–12.3)

## 2021-11-12 ENCOUNTER — HOSPITAL ENCOUNTER (OUTPATIENT)
Age: 81
Setting detail: SPECIMEN
Discharge: HOME | End: 2021-11-12
Payer: MEDICARE

## 2021-11-12 LAB
INR BLD: 2.8
PROTHROMBIN TIME: 27.2 SEC (ref 9.1–12.3)

## 2021-11-19 ENCOUNTER — HOSPITAL ENCOUNTER (OUTPATIENT)
Age: 81
Setting detail: SPECIMEN
Discharge: HOME | End: 2021-11-19

## 2021-11-19 LAB
INR BLD: 3.6
PROTHROMBIN TIME: 34.6 SEC (ref 9.1–12.3)

## 2021-11-30 ENCOUNTER — HOSPITAL ENCOUNTER (OUTPATIENT)
Age: 81
Setting detail: SPECIMEN
Discharge: HOME | End: 2021-11-30

## 2021-11-30 LAB
INR BLD: 4.7
PROTHROMBIN TIME: 44.5 SEC (ref 9.1–12.3)

## 2021-12-07 ENCOUNTER — HOSPITAL ENCOUNTER (OUTPATIENT)
Age: 81
Setting detail: SPECIMEN
Discharge: HOME | End: 2021-12-07
Payer: MEDICARE

## 2021-12-07 LAB
INR BLD: 2.2
PROTHROMBIN TIME: 21.7 SEC (ref 9.1–12.3)

## 2021-12-07 PROCEDURE — P9603 ONE-WAY ALLOW PRORATED MILES: HCPCS

## 2021-12-07 PROCEDURE — 36415 COLL VENOUS BLD VENIPUNCTURE: CPT

## 2021-12-07 PROCEDURE — 85610 PROTHROMBIN TIME: CPT

## 2021-12-09 ENCOUNTER — HOSPITAL ENCOUNTER (OUTPATIENT)
Age: 81
Setting detail: SPECIMEN
Discharge: HOME | End: 2021-12-09
Payer: MEDICARE

## 2021-12-09 LAB
INR BLD: 2.1
PROTHROMBIN TIME: 21 SEC (ref 9.1–12.3)

## 2021-12-09 PROCEDURE — P9603 ONE-WAY ALLOW PRORATED MILES: HCPCS

## 2021-12-09 PROCEDURE — 36415 COLL VENOUS BLD VENIPUNCTURE: CPT

## 2021-12-09 PROCEDURE — 85610 PROTHROMBIN TIME: CPT

## 2021-12-13 ENCOUNTER — HOSPITAL ENCOUNTER (OUTPATIENT)
Age: 81
Setting detail: SPECIMEN
Discharge: HOME | End: 2021-12-13
Payer: MEDICARE

## 2021-12-13 PROCEDURE — 85610 PROTHROMBIN TIME: CPT

## 2021-12-13 PROCEDURE — 36415 COLL VENOUS BLD VENIPUNCTURE: CPT

## 2021-12-13 PROCEDURE — P9603 ONE-WAY ALLOW PRORATED MILES: HCPCS

## 2021-12-14 LAB
INR BLD: 2.2
PROTHROMBIN TIME: 22.1 SEC (ref 9.1–12.3)

## 2021-12-16 ENCOUNTER — HOSPITAL ENCOUNTER (OUTPATIENT)
Age: 81
Setting detail: SPECIMEN
Discharge: HOME | End: 2021-12-16
Payer: MEDICARE

## 2021-12-16 LAB
INR BLD: 2
PROTHROMBIN TIME: 20.6 SEC (ref 9.1–12.3)

## 2021-12-16 PROCEDURE — 85610 PROTHROMBIN TIME: CPT

## 2021-12-16 PROCEDURE — P9603 ONE-WAY ALLOW PRORATED MILES: HCPCS

## 2021-12-16 PROCEDURE — 36415 COLL VENOUS BLD VENIPUNCTURE: CPT

## 2021-12-20 ENCOUNTER — HOSPITAL ENCOUNTER (OUTPATIENT)
Age: 81
Setting detail: SPECIMEN
Discharge: HOME | End: 2021-12-20
Payer: MEDICARE

## 2021-12-20 LAB
INR BLD: 2.1
PROTHROMBIN TIME: 21.4 SEC (ref 9.1–12.3)

## 2021-12-20 PROCEDURE — P9603 ONE-WAY ALLOW PRORATED MILES: HCPCS

## 2021-12-20 PROCEDURE — 85610 PROTHROMBIN TIME: CPT

## 2021-12-20 PROCEDURE — 36415 COLL VENOUS BLD VENIPUNCTURE: CPT

## 2021-12-23 ENCOUNTER — HOSPITAL ENCOUNTER (OUTPATIENT)
Age: 81
Setting detail: SPECIMEN
Discharge: HOME | End: 2021-12-23
Payer: MEDICARE

## 2021-12-23 LAB
INR BLD: 2.4
PROTHROMBIN TIME: 23.7 SEC (ref 9.1–12.3)

## 2021-12-23 PROCEDURE — 85610 PROTHROMBIN TIME: CPT

## 2021-12-23 PROCEDURE — 36415 COLL VENOUS BLD VENIPUNCTURE: CPT

## 2021-12-23 PROCEDURE — P9603 ONE-WAY ALLOW PRORATED MILES: HCPCS

## 2021-12-27 ENCOUNTER — HOSPITAL ENCOUNTER (OUTPATIENT)
Age: 81
Setting detail: SPECIMEN
Discharge: HOME | End: 2021-12-27
Payer: MEDICARE

## 2021-12-27 LAB
INR BLD: 2.4
PROTHROMBIN TIME: 24.1 SEC (ref 9.1–12.3)

## 2021-12-27 PROCEDURE — 85610 PROTHROMBIN TIME: CPT

## 2021-12-27 PROCEDURE — P9603 ONE-WAY ALLOW PRORATED MILES: HCPCS

## 2021-12-27 PROCEDURE — 36415 COLL VENOUS BLD VENIPUNCTURE: CPT

## 2021-12-30 ENCOUNTER — HOSPITAL ENCOUNTER (OUTPATIENT)
Age: 81
Setting detail: SPECIMEN
Discharge: HOME | End: 2021-12-30
Payer: MEDICARE

## 2021-12-30 LAB
INR BLD: 2.4
PROTHROMBIN TIME: 24 SEC (ref 9.1–12.3)

## 2021-12-30 PROCEDURE — 36415 COLL VENOUS BLD VENIPUNCTURE: CPT

## 2021-12-30 PROCEDURE — P9603 ONE-WAY ALLOW PRORATED MILES: HCPCS

## 2021-12-30 PROCEDURE — 85610 PROTHROMBIN TIME: CPT

## 2022-01-03 ENCOUNTER — HOSPITAL ENCOUNTER (OUTPATIENT)
Age: 82
Setting detail: SPECIMEN
Discharge: HOME | End: 2022-01-03
Payer: MEDICARE

## 2022-01-03 LAB
INR BLD: 3.4
PROTHROMBIN TIME: 33.2 SEC (ref 9.1–12.3)

## 2022-01-03 PROCEDURE — P9603 ONE-WAY ALLOW PRORATED MILES: HCPCS

## 2022-01-03 PROCEDURE — 36415 COLL VENOUS BLD VENIPUNCTURE: CPT

## 2022-01-03 PROCEDURE — 85610 PROTHROMBIN TIME: CPT

## 2022-01-19 ENCOUNTER — HOSPITAL ENCOUNTER (OUTPATIENT)
Age: 82
Setting detail: SPECIMEN
Discharge: HOME | End: 2022-01-19

## 2022-01-19 LAB
INR BLD: 4.6
PROTHROMBIN TIME: 43.8 SEC (ref 9.1–12.3)

## 2022-01-26 ENCOUNTER — HOSPITAL ENCOUNTER (OUTPATIENT)
Age: 82
Setting detail: SPECIMEN
Discharge: HOME | End: 2022-01-26

## 2022-01-26 LAB
INR BLD: 1.8
PROTHROMBIN TIME: 18.4 SEC (ref 9.1–12.3)

## 2022-02-02 ENCOUNTER — HOSPITAL ENCOUNTER (OUTPATIENT)
Age: 82
Setting detail: SPECIMEN
Discharge: HOME | End: 2022-02-02

## 2022-02-02 LAB
INR BLD: 2.4
PROTHROMBIN TIME: 23.6 SEC (ref 9.1–12.3)

## 2022-02-09 ENCOUNTER — HOSPITAL ENCOUNTER (OUTPATIENT)
Age: 82
Setting detail: SPECIMEN
Discharge: HOME | End: 2022-02-09

## 2022-02-09 LAB
INR BLD: 3.2
PROTHROMBIN TIME: 31.2 SEC (ref 9.1–12.3)

## 2022-02-16 ENCOUNTER — HOSPITAL ENCOUNTER (OUTPATIENT)
Age: 82
Setting detail: SPECIMEN
Discharge: HOME | End: 2022-02-16

## 2022-02-16 LAB
INR BLD: 2.8
PROTHROMBIN TIME: 27.1 SEC (ref 9.1–12.3)

## 2022-03-02 ENCOUNTER — HOSPITAL ENCOUNTER (OUTPATIENT)
Age: 82
Setting detail: SPECIMEN
Discharge: HOME | End: 2022-03-02

## 2022-03-02 LAB
INR BLD: 5.7
PROTHROMBIN TIME: 53.2 SEC (ref 9.1–12.3)

## 2022-03-04 ENCOUNTER — HOSPITAL ENCOUNTER (OUTPATIENT)
Age: 82
Setting detail: SPECIMEN
Discharge: HOME | End: 2022-03-04

## 2022-03-04 LAB
INR BLD: 3.5
PROTHROMBIN TIME: 33.9 SEC (ref 9.1–12.3)

## 2022-03-11 ENCOUNTER — HOSPITAL ENCOUNTER (OUTPATIENT)
Age: 82
Setting detail: SPECIMEN
Discharge: HOME | End: 2022-03-11

## 2022-03-11 LAB
INR BLD: 1.8
PROTHROMBIN TIME: 18.3 SEC (ref 9.1–12.3)

## 2022-03-18 ENCOUNTER — HOSPITAL ENCOUNTER (OUTPATIENT)
Age: 82
Setting detail: SPECIMEN
Discharge: HOME | End: 2022-03-18

## 2022-03-18 LAB
INR BLD: 2.5
PROTHROMBIN TIME: 25.2 SEC (ref 9.1–12.3)

## 2022-04-01 ENCOUNTER — HOSPITAL ENCOUNTER (OUTPATIENT)
Age: 82
Setting detail: SPECIMEN
Discharge: HOME | End: 2022-04-01

## 2022-04-01 LAB
INR BLD: 3.7
PROTHROMBIN TIME: 35.8 SEC (ref 9.1–12.3)

## 2022-04-08 ENCOUNTER — HOSPITAL ENCOUNTER (OUTPATIENT)
Age: 82
Setting detail: SPECIMEN
Discharge: HOME | End: 2022-04-08

## 2022-04-08 LAB
INR BLD: 2.1
PROTHROMBIN TIME: 21.4 SEC (ref 9.1–12.3)

## 2022-04-14 ENCOUNTER — HOSPITAL ENCOUNTER (OUTPATIENT)
Age: 82
Setting detail: SPECIMEN
Discharge: HOME | End: 2022-04-14

## 2022-04-14 LAB
INR BLD: 1.7
PROTHROMBIN TIME: 17.5 SEC (ref 9.1–12.3)

## 2022-04-21 ENCOUNTER — HOSPITAL ENCOUNTER (OUTPATIENT)
Age: 82
Setting detail: SPECIMEN
Discharge: HOME | End: 2022-04-21

## 2022-04-21 LAB
INR BLD: 3.1
PROTHROMBIN TIME: 30 SEC (ref 9.1–12.3)

## 2022-04-28 ENCOUNTER — HOSPITAL ENCOUNTER (OUTPATIENT)
Age: 82
Setting detail: SPECIMEN
Discharge: HOME | End: 2022-04-28

## 2022-04-28 LAB
INR BLD: 3.6
PROTHROMBIN TIME: 34.3 SEC (ref 9.1–12.3)

## 2022-05-05 ENCOUNTER — HOSPITAL ENCOUNTER (OUTPATIENT)
Age: 82
Setting detail: SPECIMEN
Discharge: HOME | End: 2022-05-05

## 2022-05-05 LAB
INR BLD: 2.5
PROTHROMBIN TIME: 24.7 SEC (ref 9.1–12.3)

## 2022-05-19 ENCOUNTER — HOSPITAL ENCOUNTER (OUTPATIENT)
Age: 82
Setting detail: SPECIMEN
Discharge: HOME | End: 2022-05-19

## 2022-05-19 LAB
INR BLD: 3.3
PROTHROMBIN TIME: 32.1 SEC (ref 9.1–12.3)

## 2022-05-26 ENCOUNTER — HOSPITAL ENCOUNTER (OUTPATIENT)
Age: 82
Setting detail: SPECIMEN
Discharge: HOME | End: 2022-05-26

## 2022-05-26 LAB
INR BLD: 2.9
PROTHROMBIN TIME: 28.2 SEC (ref 9.1–12.3)

## 2022-06-09 ENCOUNTER — HOSPITAL ENCOUNTER (OUTPATIENT)
Age: 82
Setting detail: SPECIMEN
Discharge: HOME | End: 2022-06-09

## 2022-06-09 LAB
INR BLD: 2.5
PROTHROMBIN TIME: 25 SEC (ref 9.1–12.3)

## 2022-07-07 ENCOUNTER — HOSPITAL ENCOUNTER (OUTPATIENT)
Age: 82
Setting detail: SPECIMEN
Discharge: HOME | End: 2022-07-07

## 2022-07-07 LAB
INR BLD: 4.5
PROTHROMBIN TIME: 42.1 SEC (ref 9.1–12.3)

## 2022-07-14 ENCOUNTER — HOSPITAL ENCOUNTER (OUTPATIENT)
Age: 82
Setting detail: SPECIMEN
Discharge: HOME | End: 2022-07-14

## 2022-07-14 LAB
INR BLD: 2.8
PROTHROMBIN TIME: 27.5 SEC (ref 9.1–12.3)

## 2022-07-27 ENCOUNTER — HOSPITAL ENCOUNTER (OUTPATIENT)
Age: 82
Setting detail: SPECIMEN
Discharge: HOME | End: 2022-07-27

## 2022-07-27 LAB
INR BLD: 4.2
PROTHROMBIN TIME: 39.6 SEC (ref 9.1–12.3)

## 2022-08-03 ENCOUNTER — HOSPITAL ENCOUNTER (OUTPATIENT)
Age: 82
Setting detail: SPECIMEN
Discharge: HOME | End: 2022-08-03

## 2022-08-03 LAB
INR BLD: 3.4
PROTHROMBIN TIME: 32.9 SEC (ref 9.1–12.3)

## 2022-08-11 ENCOUNTER — HOSPITAL ENCOUNTER (OUTPATIENT)
Age: 82
Setting detail: SPECIMEN
Discharge: HOME | End: 2022-08-11

## 2022-08-11 LAB
INR BLD: 3.5
PROTHROMBIN TIME: 33.6 SEC (ref 9.1–12.3)

## 2022-08-17 ENCOUNTER — HOSPITAL ENCOUNTER (OUTPATIENT)
Age: 82
Setting detail: SPECIMEN
Discharge: HOME | End: 2022-08-17

## 2022-08-17 LAB
INR BLD: 2.6
PROTHROMBIN TIME: 25.7 SEC (ref 9.1–12.3)

## 2022-08-24 ENCOUNTER — HOSPITAL ENCOUNTER (OUTPATIENT)
Age: 82
Setting detail: SPECIMEN
Discharge: HOME | End: 2022-08-24

## 2022-08-24 LAB
INR BLD: 1.9
PROTHROMBIN TIME: 19.6 SEC (ref 9.1–12.3)

## 2022-08-31 ENCOUNTER — HOSPITAL ENCOUNTER (OUTPATIENT)
Age: 82
Setting detail: SPECIMEN
Discharge: HOME | End: 2022-08-31

## 2022-08-31 LAB
INR BLD: 1.5
PROTHROMBIN TIME: 15.6 SEC (ref 9.1–12.3)

## 2022-09-13 ENCOUNTER — HOSPITAL ENCOUNTER (OUTPATIENT)
Age: 82
Setting detail: SPECIMEN
Discharge: HOME | End: 2022-09-13

## 2022-09-13 LAB
INR BLD: 1.6
PROTHROMBIN TIME: 16.5 SEC (ref 9.1–12.3)

## 2022-09-27 ENCOUNTER — HOSPITAL ENCOUNTER (OUTPATIENT)
Age: 82
Setting detail: SPECIMEN
Discharge: HOME | End: 2022-09-27

## 2022-09-27 LAB
INR BLD: 1.4
PROTHROMBIN TIME: 13.7 SEC (ref 9.1–12.3)

## 2022-10-04 ENCOUNTER — HOSPITAL ENCOUNTER (OUTPATIENT)
Age: 82
Setting detail: SPECIMEN
Discharge: HOME | End: 2022-10-04

## 2022-10-04 LAB
INR BLD: 1.8
PROTHROMBIN TIME: 17.6 SEC (ref 9.1–12.3)

## 2022-10-11 ENCOUNTER — HOSPITAL ENCOUNTER (OUTPATIENT)
Age: 82
Setting detail: SPECIMEN
Discharge: HOME | End: 2022-10-11

## 2022-10-11 LAB
INR BLD: 2.1
PROTHROMBIN TIME: 19.6 SEC (ref 9.1–12.3)

## 2022-10-18 ENCOUNTER — HOSPITAL ENCOUNTER (OUTPATIENT)
Age: 82
Setting detail: SPECIMEN
Discharge: HOME | End: 2022-10-18

## 2022-10-18 LAB
INR BLD: 2.3
PROTHROMBIN TIME: 22.9 SEC (ref 9.1–12.3)

## 2022-11-03 ENCOUNTER — HOSPITAL ENCOUNTER (OUTPATIENT)
Age: 82
Setting detail: SPECIMEN
Discharge: HOME | End: 2022-11-03

## 2022-11-03 LAB
INR BLD: 1.3
PROTHROMBIN TIME: 13.6 SEC (ref 9.1–12.3)

## 2022-11-16 ENCOUNTER — HOSPITAL ENCOUNTER (OUTPATIENT)
Age: 82
Setting detail: SPECIMEN
Discharge: HOME | End: 2022-11-16

## 2022-11-16 LAB
INR BLD: 1.9
PROTHROMBIN TIME: 19.6 SEC (ref 9.1–12.3)

## 2022-11-23 ENCOUNTER — HOSPITAL ENCOUNTER (OUTPATIENT)
Age: 82
Setting detail: SPECIMEN
Discharge: HOME | End: 2022-11-23

## 2022-11-23 LAB
INR BLD: 1.6
PROTHROMBIN TIME: 16.9 SEC (ref 9.1–12.3)

## 2022-11-30 ENCOUNTER — HOSPITAL ENCOUNTER (OUTPATIENT)
Age: 82
Setting detail: SPECIMEN
Discharge: HOME | End: 2022-11-30

## 2022-11-30 LAB
INR BLD: 1.8
PROTHROMBIN TIME: 18.7 SEC (ref 9.1–12.3)

## 2022-12-07 ENCOUNTER — HOSPITAL ENCOUNTER (OUTPATIENT)
Age: 82
Setting detail: SPECIMEN
Discharge: HOME | End: 2022-12-07

## 2022-12-07 LAB
INR BLD: 2.2
PROTHROMBIN TIME: 21.8 SEC (ref 9.1–12.3)

## 2022-12-14 ENCOUNTER — HOSPITAL ENCOUNTER (OUTPATIENT)
Age: 82
Setting detail: SPECIMEN
Discharge: HOME | End: 2022-12-14

## 2022-12-14 LAB
INR BLD: 2
PROTHROMBIN TIME: 19.9 SEC (ref 9.1–12.3)

## 2022-12-28 ENCOUNTER — HOSPITAL ENCOUNTER (OUTPATIENT)
Age: 82
Setting detail: SPECIMEN
Discharge: HOME | End: 2022-12-28

## 2022-12-28 LAB
INR BLD: 1.8
PROTHROMBIN TIME: 18.7 SEC (ref 9.1–12.3)

## 2023-01-04 ENCOUNTER — HOSPITAL ENCOUNTER (OUTPATIENT)
Age: 83
Setting detail: SPECIMEN
Discharge: HOME | End: 2023-01-04

## 2023-01-04 LAB
INR BLD: 2.7
PROTHROMBIN TIME: 26.8 SEC (ref 9.1–12.3)

## 2023-01-17 ENCOUNTER — HOSPITAL ENCOUNTER (OUTPATIENT)
Age: 83
Setting detail: SPECIMEN
Discharge: HOME | End: 2023-01-17

## 2023-01-17 LAB
INR BLD: 2.5
PROTHROMBIN TIME: 25 SEC (ref 9.1–12.3)

## 2023-02-10 ENCOUNTER — HOSPITAL ENCOUNTER (OUTPATIENT)
Age: 83
Setting detail: SPECIMEN
Discharge: HOME | End: 2023-02-10

## 2023-02-10 LAB
25(OH)D3 SERPL-MCNC: 26.1 NG/ML
ABSOLUTE EOS #: 0.25 K/UL (ref 0–0.44)
ABSOLUTE IMMATURE GRANULOCYTE: <0.03 K/UL (ref 0–0.3)
ABSOLUTE LYMPH #: 1.56 K/UL (ref 1.1–3.7)
ABSOLUTE MONO #: 0.56 K/UL (ref 0.1–1.2)
BASOPHILS # BLD: 1 % (ref 0–2)
BASOPHILS ABSOLUTE: 0.05 K/UL (ref 0–0.2)
CHOLEST SERPL-MCNC: 150 MG/DL
CHOLESTEROL/HDL RATIO: 3.8
EOSINOPHILS RELATIVE PERCENT: 4 % (ref 1–4)
EST. AVERAGE GLUCOSE BLD GHB EST-MCNC: 123 MG/DL
FOLATE SERPL-MCNC: >20 NG/ML
HBA1C MFR BLD: 5.9 % (ref 4–6)
HCT VFR BLD AUTO: 40.3 % (ref 36.3–47.1)
HDLC SERPL-MCNC: 39 MG/DL
HGB BLD-MCNC: 12.3 G/DL (ref 11.9–15.1)
IMMATURE GRANULOCYTES: 0 %
INR PPP: 2.4
LDLC SERPL CALC-MCNC: 92 MG/DL (ref 0–130)
LYMPHOCYTES # BLD: 23 % (ref 24–43)
MAGNESIUM SERPL-MCNC: 2.1 MG/DL (ref 1.6–2.6)
MCH RBC QN AUTO: 31.3 PG (ref 25.2–33.5)
MCHC RBC AUTO-ENTMCNC: 30.5 G/DL (ref 28.4–34.8)
MCV RBC AUTO: 102.5 FL (ref 82.6–102.9)
MONOCYTES # BLD: 8 % (ref 3–12)
NRBC AUTOMATED: 0 PER 100 WBC
PDW BLD-RTO: 14.5 % (ref 11.8–14.4)
PHOSPHATE SERPL-MCNC: 3.5 MG/DL (ref 2.6–4.5)
PLATELET # BLD AUTO: 208 K/UL (ref 138–453)
PMV BLD AUTO: 11.5 FL (ref 8.1–13.5)
PROTHROMBIN TIME: 24.3 SEC (ref 9.1–12.3)
RBC # BLD: 3.93 M/UL (ref 3.95–5.11)
RBC # BLD: ABNORMAL 10*6/UL
SEG NEUTROPHILS: 64 % (ref 36–65)
SEGMENTED NEUTROPHILS ABSOLUTE COUNT: 4.31 K/UL (ref 1.5–8.1)
T3FREE SERPL-MCNC: 1.98 PG/ML (ref 2.02–4.43)
T4 FREE SERPL-MCNC: 1.09 NG/DL (ref 0.93–1.7)
TRIGL SERPL-MCNC: 95 MG/DL
TSH SERPL-ACNC: 2.52 UIU/ML (ref 0.3–5)
URATE SERPL-MCNC: 5.9 MG/DL (ref 2.4–5.7)
WBC # BLD AUTO: 6.7 K/UL (ref 3.5–11.3)

## 2023-05-12 ENCOUNTER — ANTI-COAG VISIT (OUTPATIENT)
Dept: CARDIOLOGY | Age: 83
End: 2023-05-12

## 2023-05-12 DIAGNOSIS — I48.4 ATYPICAL ATRIAL FLUTTER (HCC): Primary | ICD-10-CM

## 2023-05-12 LAB — INR BLD: 2.5

## 2023-06-20 ENCOUNTER — TELEPHONE (OUTPATIENT)
Dept: FAMILY MEDICINE CLINIC | Age: 83
End: 2023-06-20

## 2023-06-23 ENCOUNTER — ANTI-COAG VISIT (OUTPATIENT)
Dept: CARDIOLOGY | Age: 83
End: 2023-06-23

## 2023-06-23 DIAGNOSIS — I48.4 ATYPICAL ATRIAL FLUTTER (HCC): Primary | ICD-10-CM

## 2023-06-23 LAB — INR BLD: 6.1

## 2023-08-14 ENCOUNTER — ANTI-COAG VISIT (OUTPATIENT)
Dept: CARDIOLOGY | Age: 83
End: 2023-08-14
Payer: MEDICARE

## 2023-08-14 LAB — INR BLD: 3.4

## 2023-08-14 PROCEDURE — 93793 ANTICOAG MGMT PT WARFARIN: CPT | Performed by: NURSE PRACTITIONER

## 2023-09-01 LAB — INR BLD: 2.1

## 2023-09-04 ENCOUNTER — ANTI-COAG VISIT (OUTPATIENT)
Dept: CARDIOLOGY | Age: 83
End: 2023-09-04

## 2023-09-04 DIAGNOSIS — I48.4 ATYPICAL ATRIAL FLUTTER (HCC): Primary | ICD-10-CM

## 2023-09-05 ENCOUNTER — ANTI-COAG VISIT (OUTPATIENT)
Dept: CARDIOLOGY | Age: 83
End: 2023-09-05

## 2023-09-05 DIAGNOSIS — I48.4 ATYPICAL ATRIAL FLUTTER (HCC): Primary | ICD-10-CM

## 2023-09-05 RX ORDER — WARFARIN SODIUM 4 MG/1
4 TABLET ORAL DAILY
Qty: 30 TABLET | Refills: 6 | Status: SHIPPED | OUTPATIENT
Start: 2023-09-05

## 2023-09-29 LAB — INR BLD: 2.9

## 2023-10-03 ENCOUNTER — ANTI-COAG VISIT (OUTPATIENT)
Dept: CARDIOLOGY | Age: 83
End: 2023-10-03

## 2023-10-03 DIAGNOSIS — I48.4 ATYPICAL ATRIAL FLUTTER (HCC): Primary | ICD-10-CM

## 2023-10-27 LAB — INR BLD: 2.3

## 2023-10-30 ENCOUNTER — ANTI-COAG VISIT (OUTPATIENT)
Dept: CARDIOLOGY | Age: 83
End: 2023-10-30
Payer: MEDICARE

## 2023-10-30 DIAGNOSIS — I48.4 ATYPICAL ATRIAL FLUTTER (HCC): Primary | ICD-10-CM

## 2023-10-30 PROCEDURE — 93793 ANTICOAG MGMT PT WARFARIN: CPT | Performed by: NURSE PRACTITIONER

## 2023-11-20 ENCOUNTER — ANTI-COAG VISIT (OUTPATIENT)
Dept: CARDIOLOGY | Age: 83
End: 2023-11-20

## 2023-11-20 LAB — INR BLD: 2.9

## 2023-12-01 ENCOUNTER — ANTI-COAG VISIT (OUTPATIENT)
Dept: CARDIOLOGY | Age: 83
End: 2023-12-01

## 2023-12-01 DIAGNOSIS — I48.4 ATYPICAL ATRIAL FLUTTER (HCC): Primary | ICD-10-CM

## 2023-12-01 LAB — INR BLD: 2.9

## 2023-12-15 ENCOUNTER — ANTI-COAG VISIT (OUTPATIENT)
Dept: CARDIOLOGY | Age: 83
End: 2023-12-15

## 2023-12-15 DIAGNOSIS — I48.4 ATYPICAL ATRIAL FLUTTER (HCC): Primary | ICD-10-CM

## 2023-12-15 LAB — INR BLD: 2.2

## 2023-12-29 LAB — INR BLD: 2.4

## 2024-01-02 ENCOUNTER — ANTI-COAG VISIT (OUTPATIENT)
Dept: CARDIOLOGY | Age: 84
End: 2024-01-02

## 2024-01-02 DIAGNOSIS — I48.4 ATYPICAL ATRIAL FLUTTER (HCC): Primary | ICD-10-CM

## 2024-01-26 ENCOUNTER — ANTI-COAG VISIT (OUTPATIENT)
Dept: CARDIOLOGY | Age: 84
End: 2024-01-26

## 2024-01-26 DIAGNOSIS — I48.4 ATYPICAL ATRIAL FLUTTER (HCC): Primary | ICD-10-CM

## 2024-01-26 LAB — INR BLD: 2.8

## 2024-02-05 ENCOUNTER — ANTI-COAG VISIT (OUTPATIENT)
Dept: CARDIOLOGY | Age: 84
End: 2024-02-05

## 2024-02-05 DIAGNOSIS — I48.4 ATYPICAL ATRIAL FLUTTER (HCC): Primary | ICD-10-CM

## 2024-02-05 LAB — INR BLD: 2.9

## 2024-02-09 LAB — INR BLD: 2.2

## 2024-02-12 ENCOUNTER — ANTI-COAG VISIT (OUTPATIENT)
Dept: CARDIOLOGY | Age: 84
End: 2024-02-12

## 2024-02-12 DIAGNOSIS — I48.4 ATYPICAL ATRIAL FLUTTER (HCC): Primary | ICD-10-CM

## 2024-02-19 LAB — INR BLD: 2.5

## 2024-02-20 ENCOUNTER — ANTI-COAG VISIT (OUTPATIENT)
Dept: CARDIOLOGY CLINIC | Age: 84
End: 2024-02-20

## 2024-02-20 DIAGNOSIS — I48.4 ATYPICAL ATRIAL FLUTTER (HCC): Primary | ICD-10-CM

## 2024-03-08 ENCOUNTER — ANTI-COAG VISIT (OUTPATIENT)
Dept: CARDIOLOGY | Age: 84
End: 2024-03-08

## 2024-03-08 LAB — INR BLD: 2.6

## 2024-03-30 LAB — INR BLD: 2.7

## 2024-04-01 ENCOUNTER — ANTI-COAG VISIT (OUTPATIENT)
Dept: CARDIOLOGY | Age: 84
End: 2024-04-01

## 2024-04-01 DIAGNOSIS — I48.4 ATYPICAL ATRIAL FLUTTER (HCC): Primary | ICD-10-CM

## 2024-05-10 LAB — INR BLD: 2.3

## 2024-05-13 ENCOUNTER — ANTI-COAG VISIT (OUTPATIENT)
Dept: CARDIOLOGY | Age: 84
End: 2024-05-13
Payer: MEDICARE

## 2024-05-13 DIAGNOSIS — I48.4 ATYPICAL ATRIAL FLUTTER (HCC): Primary | ICD-10-CM

## 2024-05-13 PROCEDURE — 93793 ANTICOAG MGMT PT WARFARIN: CPT | Performed by: NURSE PRACTITIONER

## 2024-05-31 ENCOUNTER — ANTI-COAG VISIT (OUTPATIENT)
Dept: CARDIOLOGY | Age: 84
End: 2024-05-31

## 2024-05-31 DIAGNOSIS — I48.4 ATYPICAL ATRIAL FLUTTER (HCC): Primary | ICD-10-CM

## 2024-05-31 LAB — INR BLD: 3

## 2024-06-28 ENCOUNTER — ANTI-COAG VISIT (OUTPATIENT)
Dept: CARDIOLOGY | Age: 84
End: 2024-06-28

## 2024-06-28 LAB — INR BLD: 2.1

## 2024-07-22 ENCOUNTER — ANTI-COAG VISIT (OUTPATIENT)
Dept: CARDIOLOGY | Age: 84
End: 2024-07-22

## 2024-07-22 LAB — INR BLD: 2.2

## 2024-09-13 ENCOUNTER — ANTI-COAG VISIT (OUTPATIENT)
Dept: CARDIOLOGY | Age: 84
End: 2024-09-13

## 2024-09-13 DIAGNOSIS — I48.4 ATYPICAL ATRIAL FLUTTER (HCC): Primary | ICD-10-CM

## 2024-09-13 LAB — INR BLD: 2.2

## 2025-01-07 ENCOUNTER — ANTI-COAG VISIT (OUTPATIENT)
Dept: CARDIOLOGY CLINIC | Age: 85
End: 2025-01-07

## 2025-01-07 DIAGNOSIS — I48.4 ATYPICAL ATRIAL FLUTTER (HCC): Primary | ICD-10-CM

## 2025-01-07 LAB — INR BLD: 2.6

## 2025-04-21 ENCOUNTER — ANTI-COAG VISIT (OUTPATIENT)
Dept: CARDIOLOGY | Age: 85
End: 2025-04-21

## 2025-04-21 ENCOUNTER — ANTI-COAG VISIT (OUTPATIENT)
Dept: CARDIOLOGY | Age: 85
End: 2025-04-21
Payer: MEDICARE

## 2025-04-21 DIAGNOSIS — I48.4 ATYPICAL ATRIAL FLUTTER (HCC): Primary | ICD-10-CM

## 2025-04-21 LAB
INR BLD: 2.4
INR BLD: 2.4

## 2025-04-21 PROCEDURE — 93793 ANTICOAG MGMT PT WARFARIN: CPT | Performed by: NURSE PRACTITIONER

## 2025-05-09 ENCOUNTER — ANTI-COAG VISIT (OUTPATIENT)
Dept: CARDIOLOGY | Age: 85
End: 2025-05-09

## 2025-05-09 DIAGNOSIS — I48.4 ATYPICAL ATRIAL FLUTTER (HCC): Primary | ICD-10-CM

## 2025-05-09 LAB — INR BLD: 2.7

## 2025-06-16 ENCOUNTER — ANTI-COAG VISIT (OUTPATIENT)
Dept: CARDIOLOGY | Age: 85
End: 2025-06-16
Payer: MEDICARE

## 2025-06-16 DIAGNOSIS — I48.4 ATYPICAL ATRIAL FLUTTER (HCC): Primary | ICD-10-CM

## 2025-06-16 LAB — INR BLD: 2.1

## 2025-06-16 PROCEDURE — 93793 ANTICOAG MGMT PT WARFARIN: CPT | Performed by: NURSE PRACTITIONER

## 2025-06-27 LAB — INR BLD: 2

## 2025-06-30 ENCOUNTER — ANTI-COAG VISIT (OUTPATIENT)
Dept: CARDIOLOGY | Age: 85
End: 2025-06-30
Payer: MEDICARE

## 2025-06-30 DIAGNOSIS — I48.4 ATYPICAL ATRIAL FLUTTER (HCC): Primary | ICD-10-CM

## 2025-06-30 PROCEDURE — 93793 ANTICOAG MGMT PT WARFARIN: CPT | Performed by: NURSE PRACTITIONER

## 2025-07-14 ENCOUNTER — ANTI-COAG VISIT (OUTPATIENT)
Dept: INTERNAL MEDICINE CLINIC | Age: 85
End: 2025-07-14

## 2025-07-14 DIAGNOSIS — I48.4 ATYPICAL ATRIAL FLUTTER (HCC): Primary | ICD-10-CM

## 2025-07-14 LAB — INR BLD: 2.4

## 2025-07-14 PROCEDURE — 93793 ANTICOAG MGMT PT WARFARIN: CPT | Performed by: NURSE PRACTITIONER

## 2025-08-11 ENCOUNTER — ANTI-COAG VISIT (OUTPATIENT)
Dept: CARDIOLOGY | Age: 85
End: 2025-08-11
Payer: MEDICARE

## 2025-08-11 DIAGNOSIS — I48.4 ATYPICAL ATRIAL FLUTTER (HCC): Primary | ICD-10-CM

## 2025-08-11 LAB — INR BLD: 1.9

## 2025-08-11 PROCEDURE — 93793 ANTICOAG MGMT PT WARFARIN: CPT | Performed by: NURSE PRACTITIONER

## (undated) DEVICE — GARMENT,MEDLINE,DVT,INT,CALF,MED, GEN2: Brand: MEDLINE

## (undated) DEVICE — Device

## (undated) DEVICE — GLOVE ORANGE PI 7   MSG9070

## (undated) DEVICE — GARMENT COMPR STD FOR 17IN CALF UNIF THER FLOTRN

## (undated) DEVICE — NITINOL STONE RETRIEVAL BASKET: Brand: ZERO TIP

## (undated) DEVICE — PROTECTOR ULN NRV PUR FOAM HK LOOP STRP ANATOMICALLY

## (undated) DEVICE — STRIP,CLOSURE,WOUND,MEDI-STRIP,1/2X4: Brand: MEDLINE

## (undated) DEVICE — SYRINGE MED 20ML STD CLR PLAS LUERLOCK TIP N CTRL DISP

## (undated) DEVICE — CATHETER URET 5FR L70CM TIP 8FR OPN END CONE TIP INJ HUB

## (undated) DEVICE — PACK PROCEDURE SURG CYSTO SVMMC LF

## (undated) DEVICE — GUIDEWIRE URO L150CM DIA0.035IN STIFF NIT HYDRPHLC STR TIP

## (undated) DEVICE — DRAPE,REIN 53X77,STERILE: Brand: MEDLINE

## (undated) DEVICE — DUAL LUMEN URETERAL CATHETER

## (undated) DEVICE — 3M™ STERI-STRIP™ COMPOUND BENZOIN TINCTURE 40 BAGS/CARTON 4 CARTONS/CASE C1544: Brand: 3M™ STERI-STRIP™

## (undated) DEVICE — GLOVE,EXAM,NITRILE,RESTORE,OAT SENSE,L: Brand: MEDLINE

## (undated) DEVICE — SINGLE ACTION PUMPING SYSTEM

## (undated) DEVICE — GLOVE SURG SZ 65 THK91MIL LTX FREE SYN POLYISOPRENE

## (undated) DEVICE — ADAPTER URO SCP UROLOK LL